# Patient Record
Sex: FEMALE | Race: WHITE | Employment: FULL TIME | URBAN - METROPOLITAN AREA
[De-identification: names, ages, dates, MRNs, and addresses within clinical notes are randomized per-mention and may not be internally consistent; named-entity substitution may affect disease eponyms.]

---

## 2022-04-26 ENCOUNTER — TELEPHONE (OUTPATIENT)
Dept: OBGYN CLINIC | Age: 32
End: 2022-04-26

## 2022-04-26 NOTE — TELEPHONE ENCOUNTER
Agree with labs and SAB prec. Looks like she could get US scheduled for 5/3 @ 0800 (blocked for fetal scan, but the 0730 is f/u SAB, not 20wk US, so should be OK to book). Then can put on my schedule for 0840).

## 2022-04-26 NOTE — TELEPHONE ENCOUNTER
Call received at 4:00pm       27year old patient calling to reports her [address] 3/14/2022 ( 6w1d) pregnant and got a positive upt on 4/10/2022    Patient is calling to say that she started with pink  spotting this past weekend and now the spotting is red and she put a pad on. Patient reports cramping at 3-4 on the pain scale of 1-10    Patient denies recent intercourse and reports BM this am after bleeding had started back      Patient was placed on the schedule for beta and blood  Type tomorrow at 8:30am and then on Friday at 8:30am  Placed on the schedule for lab only per Dr Russell Graves.     DONNA    Patient was provided pain and bleeding precautions and to increase po fluids, rest and tylenol

## 2022-04-27 ENCOUNTER — LAB ONLY (OUTPATIENT)
Dept: OBGYN CLINIC | Age: 32
End: 2022-04-27

## 2022-04-27 DIAGNOSIS — O46.90 VAGINAL BLEEDING DURING PREGNANCY: Primary | ICD-10-CM

## 2022-04-27 NOTE — TELEPHONE ENCOUNTER
Patient advised of MD recommendations and was rescheduled for 5/3/2022 at 8:00am to arrive at 7:45am and then MD at 8:40am ( per DM)    Patient verbalized understanding.

## 2022-04-28 LAB
ABO + RH BLD: NORMAL
BLOOD BANK CMNT PATIENT-IMP: NORMAL
BLOOD GROUP ANTIBODIES SERPL: NORMAL
HCG SERPL-ACNC: ABNORMAL MIU/ML (ref 0–6)
SPECIMEN EXP DATE BLD: NORMAL

## 2022-04-29 ENCOUNTER — LAB ONLY (OUTPATIENT)
Dept: OBGYN CLINIC | Age: 32
End: 2022-04-29

## 2022-04-29 DIAGNOSIS — O46.90 VAGINAL BLEEDING DURING PREGNANCY: Primary | ICD-10-CM

## 2022-04-30 LAB — HCG SERPL-ACNC: ABNORMAL MIU/ML (ref 0–6)

## 2022-05-02 NOTE — PROGRESS NOTES
Problem Visit-Complete    Chief Complaint   Missed Menses      HPI  Liddie Meckel is a 34 y.o. female who presents for the evaluation of pregnancy viability. Patient's last menstrual period was 03/14/2022. +nausea, smell sensitivities    Today's ultasound showed:  TV ULTRASOUND PERFORMED  A SINGLE VIABLE 7W2D IUP IS SEEN WITH NORMAL CARDIAC RHYTHM. GESTATIONAL AGE BASED ON TODAYS ULTRASOUND. A NORMAL YOLK SAC IS SEEN. RIGHT ADNEXA APPEARS WITHIN NORMAL LIMITS. LEFT OVARY APPEARS WITHIN NORMAL LIMITS. A CL CYST IS SEEN.   FREE FLUID SEEN IN THE CDS    Past Medical History:   Diagnosis Date    Anxiety     Cholesteatoma of right ear     Depression      Past Surgical History:   Procedure Laterality Date    HX BREAST REDUCTION  2012    HX OTHER SURGICAL      right ear surgery 5 times    HX WISDOM TEETH EXTRACTION       Social History     Occupational History    Not on file   Tobacco Use    Smoking status: Never Smoker    Smokeless tobacco: Never Used   Vaping Use    Vaping Use: Not on file   Substance and Sexual Activity    Alcohol use: Not Currently    Drug use: Never    Sexual activity: Yes     Partners: Male     Birth control/protection: None     Family History   Problem Relation Age of Onset    Heart Disease Mother     Diabetes Mother     Ovarian Cancer Other        Not on File  Prior to Admission medications    Not on File        Review of Systems: History obtained from the patient  Constitutional: negative for weight loss, fever, night sweats  HEENT: negative for hearing loss, earache, congestion, snoring, sorethroat  CV: negative for chest pain, palpitations, edema  Resp: negative for cough, shortness of breath, wheezing  Breast: negative for breast lumps, nipple discharge, galactorrhea  GI: negative for change in bowel habits, abdominal pain, black or bloody stools  : negative for frequency, dysuria, hematuria, vaginal discharge  MSK: negative for back pain, joint pain, muscle pain  Skin: negative for itching, rash, hives  Neuro: negative for dizziness, headache, confusion, weakness  Psych: negative for anxiety, depression, change in mood  Heme/lymph: negative for bleeding, bruising, pallor    Objective:  Visit Vitals  /76   Pulse 95   Ht 5' 7\" (1.702 m)   Wt 170 lb (77.1 kg)   LMP 2022   BMI 26.63 kg/m²       Physical Exam:     Constitutional  · Appearance: well-nourished, well developed, alert, in no acute distress    HENT  · Head and Face: appears normal    Skin  · General Inspection: no rash, no lesions identified    Neurologic/Psychiatric  · Mental Status:  · Orientation: grossly oriented to person, place and time  · Mood and Affect: mood normal, affect appropriate    Assessment:  First trimester spotting  Jeremías franks today  N/V  Lives by [facility] will probably want to continue care there. Plan:   Reviewed Jeremías FRANKS, cwmarisabel  eRX phenergan, zofran, diclegis  NOB scheduled here for . However, advised if she is planning on continuing care at [facility], should schedule NOB visit there and cancel appt here. Orders Placed This Encounter    doxylamine-pyridoxine, vit B6, (Diclegis) 10-10 mg TbEC DR tablet     Si tabs at bedtime, then 1 tab in AM if needed, then 1 tab in afternoon if needed. Max 4 tabs/d     Dispense:  120 Tablet     Refill:  2    promethazine (PHENERGAN) 25 mg tablet     Sig: Take 1 Tablet by mouth every six (6) hours as needed for Nausea. Dispense:  30 Tablet     Refill:  1    ondansetron (ZOFRAN ODT) 4 mg disintegrating tablet     Sig: Take 1 Tablet by mouth every eight (8) hours as needed for Nausea or Vomiting.      Dispense:  30 Tablet     Refill:  2

## 2022-05-03 ENCOUNTER — OFFICE VISIT (OUTPATIENT)
Dept: OBGYN CLINIC | Age: 32
End: 2022-05-03

## 2022-05-03 VITALS
WEIGHT: 170 LBS | DIASTOLIC BLOOD PRESSURE: 76 MMHG | HEIGHT: 67 IN | SYSTOLIC BLOOD PRESSURE: 124 MMHG | BODY MASS INDEX: 26.68 KG/M2 | HEART RATE: 95 BPM

## 2022-05-03 DIAGNOSIS — O46.90 VAGINAL BLEEDING DURING PREGNANCY: Primary | ICD-10-CM

## 2022-05-03 PROCEDURE — 99203 OFFICE O/P NEW LOW 30 MIN: CPT | Performed by: OBSTETRICS & GYNECOLOGY

## 2022-05-03 RX ORDER — DOXYLAMINE SUCCINATE AND PYRIDOXINE HYDROCHLORIDE, DELAYED RELEASE TABLETS 10 MG/10 MG 10; 10 MG/1; MG/1
TABLET, DELAYED RELEASE ORAL
Qty: 120 TABLET | Refills: 2 | Status: SHIPPED | OUTPATIENT
Start: 2022-05-03 | End: 2022-06-10 | Stop reason: ALTCHOICE

## 2022-05-03 RX ORDER — ONDANSETRON 4 MG/1
4 TABLET, ORALLY DISINTEGRATING ORAL
Qty: 30 TABLET | Refills: 2 | Status: SHIPPED | OUTPATIENT
Start: 2022-05-03 | End: 2022-06-10 | Stop reason: SDUPTHER

## 2022-05-03 RX ORDER — PROMETHAZINE HYDROCHLORIDE 25 MG/1
25 TABLET ORAL
Qty: 30 TABLET | Refills: 1 | Status: SHIPPED | OUTPATIENT
Start: 2022-05-03 | End: 2022-06-10 | Stop reason: SDUPTHER

## 2022-05-03 NOTE — PATIENT INSTRUCTIONS
Weeks 6 to 10 of Your Pregnancy: Care Instructions  Overview     During the first 6 to 10 weeks of your pregnancy, your body goes through many changes. Your baby grows very quickly, even though you can't feel it yet. You may start to feel different, both in your body and your emotions. Because each pregnancy is unique, there's no right way to feel. You may feel the healthiest you've ever been, or you might feel tired or sick to your stomach (\"morning sickness\"). These early weeks are a time to make healthy choices and to eat the best foods for you and your baby. This is also a good time to think about birth defects testing. These are tests done during pregnancy to look for possible problems with the baby. First-trimester tests for birth defects can be done between 8 and 13 weeks of pregnancy, depending on the test. Talk with your doctor about what kinds of tests are available. Follow-up care is a key part of your treatment and safety. Be sure to make and go to all appointments, and call your doctor if you are having problems. It's also a good idea to know your test results and keep a list of the medicines you take. How can you care for yourself at home? Eat well  · Eat at least 3 meals and 2 healthy snacks every day. Eat fresh, whole foods, including:  ? 7 or more servings of bread, tortillas, cereal, rice, pasta, or oatmeal.  ? 3 or more servings of vegetables, especially leafy green vegetables. ? 2 or more servings of fruits. ? 3 or more servings of milk, yogurt, or cheese. ? 2 or more servings of meat, turkey, chicken, fish, eggs, or dried beans. · Drink plenty of fluids, especially water. Avoid sodas and other sweetened drinks. · Choose foods that have important vitamins for your baby, such as calcium, iron, and folate. ? Dairy products, tofu, canned fish with bones, almonds, broccoli, dark leafy greens, corn tortillas, and fortified orange juice are good sources of calcium. ?  Beef, poultry, liver, spinach, lentils, dried beans, fortified cereals, and dried fruits are rich in iron. ? Dark leafy greens, broccoli, asparagus, liver, fortified cereals, orange juice, peanuts, and almonds are good sources of folate. · Avoid foods that could harm your baby. ? Do not eat raw or undercooked meat, chicken, or fish (such as sushi or raw oysters). ? Do not eat raw eggs or foods that contain raw eggs, such as Caesar dressing. ? Do not eat soft cheeses and unpasteurized dairy foods, such as Brie, feta, or blue cheese. ? Do not eat fish that contains a lot of mercury, such as shark, swordfish, tilefish, or jessy mackerel. Limit some other types of fish, such as white (albacore) tuna, to 4 oz (0.1 kg) a week. ? Do not eat raw sprouts, especially alfalfa sprouts. ? Cut down on caffeine, such as coffee, tea, and cola. Protect yourself and your baby  · Do not touch yazmin litter or cat feces. They can cause an infection that could harm your baby. · Avoid things that can make your body too hot and may be harmful to your baby, such as a hot tub or sauna. Or talk with your doctor before doing anything that raises your body temperature. Your doctor can tell you if it's safe. [place] with morning sickness  · Sip small amounts of water, juices, or shakes. Try drinking between meals, not with meals. · Eat 5 or 6 small meals a day. Try dry toast or crackers when you first get up, and eat breakfast a little later. · Avoid spicy, greasy, and fatty foods. · When you feel sick, open your windows or go for a short walk to get fresh air. · Try nausea wristbands. These help some people. · Tell your doctor if you think your prenatal vitamins make you sick. Where can you learn more? Go to http://www.gray.com/  Enter G112 in the search box to learn more about \"Weeks 6 to 10 of Your Pregnancy: Care Instructions. \"  Current as of: June 16, 2021               Content Version: 13.2  © 8436-8514 Healthwise, [employer]. Care instructions adapted under license by [employer] (which disclaims liability or warranty for this information). If you have questions about a medical condition or this instruction, always ask your healthcare professional. [address] any warranty or liability for your use of this information. Learning About When to Call Your Doctor During Pregnancy (Up to 20 Weeks)  Overview     It's common to have concerns about what might be a problem during your pregnancy. Most pregnancies don't have any serious problems. But it's still important to know when to call your doctor if you have certain symptoms. These are general suggestions. Your doctor may give you some more information about when to call. When to call your doctor (up to 20 weeks)  Call 911 anytime you think you may need emergency care. For example, call if:  · You passed out (lost consciousness). Call your doctor now or seek immediate medical care if:  · You have a fever. · You have vaginal bleeding. · You are dizzy or lightheaded, or you feel like you may faint. · You have symptoms of a urinary tract infection. These may include:  ? Pain or burning when you urinate. ? A frequent need to urinate without being able to pass much urine. ? Pain in the flank, which is just below the rib cage and above the waist on either side of the back. ? Blood in your urine. · You have belly pain. · You think you are having contractions. · You have a sudden release of fluid from your vagina. Watch closely for changes in your health, and be sure to contact your doctor if:  · You have vaginal discharge that smells bad. · You have other concerns about your pregnancy. Follow-up care is a key part of your treatment and safety. Be sure to make and go to all appointments, and call your doctor if you are having problems.  It's also a good idea to know your test results and keep a list of the medicines you take.  Where can you learn more? Go to http://www.[employer].com/  Enter Q629 in the search box to learn more about \"Learning About When to Call Your Doctor During Pregnancy (Up to 20 Weeks). \"  Current as of: June 16, 2021               Content Version: 13.2  © 5098-5227 Healthwise, [employer]. Care instructions adapted under license by [employer] (which disclaims liability or warranty for this information). If you have questions about a medical condition or this instruction, always ask your healthcare professional. [address] any warranty or liability for your use of this information.

## 2022-06-10 ENCOUNTER — INITIAL PRENATAL (OUTPATIENT)
Dept: OBGYN CLINIC | Age: 32
End: 2022-06-10

## 2022-06-10 VITALS — BODY MASS INDEX: 28.13 KG/M2 | WEIGHT: 179.6 LBS | SYSTOLIC BLOOD PRESSURE: 118 MMHG | DIASTOLIC BLOOD PRESSURE: 90 MMHG

## 2022-06-10 DIAGNOSIS — O16.1 ELEVATED BLOOD PRESSURE AFFECTING PREGNANCY IN FIRST TRIMESTER, ANTEPARTUM: ICD-10-CM

## 2022-06-10 DIAGNOSIS — Z36.9 UNSPECIFIED ANTENATAL SCREENING: Primary | ICD-10-CM

## 2022-06-10 DIAGNOSIS — Z3A.12 12 WEEKS GESTATION OF PREGNANCY: ICD-10-CM

## 2022-06-10 PROCEDURE — 0500F INITIAL PRENATAL CARE VISIT: CPT | Performed by: ADVANCED PRACTICE MIDWIFE

## 2022-06-10 RX ORDER — PROMETHAZINE HYDROCHLORIDE 25 MG/1
25 TABLET ORAL
Qty: 30 TABLET | Refills: 1 | Status: SHIPPED | OUTPATIENT
Start: 2022-06-10 | End: 2022-06-29

## 2022-06-10 RX ORDER — ONDANSETRON 4 MG/1
4 TABLET, ORALLY DISINTEGRATING ORAL
Qty: 30 TABLET | Refills: 2 | Status: SHIPPED | OUTPATIENT
Start: 2022-06-10 | End: 2022-07-12 | Stop reason: SDUPTHER

## 2022-06-10 NOTE — PATIENT INSTRUCTIONS
Weeks 10 to 14 of Your Pregnancy: Care Instructions  Overview     By weeks 10 to 14 of your pregnancy, the placenta has formed inside your uterus. The placenta's main job is to give your baby oxygen and nutrients through the umbilical cord. It's possible to hear your baby's heartbeat with a special ultrasound device. Your baby's organs are developing. The arms and legs can bend. This is a good time to think about testing for birth defects. There are two types of tests: screening and diagnostic. Screening tests show the chance that a baby has a certain birth defect. They can't tell you for sure that your baby has a problem. Diagnostic tests show if a baby has a certain birth defect. It's your choice whether to have these tests. You and your partner can talk to your doctor or midwife about tests for birth defects. Follow-up care is a key part of your treatment and safety. Be sure to make and go to all appointments, and call your doctor if you are having problems. It's also a good idea to know your test results and keep a list of the medicines you take. How can you care for yourself at home? Decide about tests  · You can have screening tests and diagnostic tests to check for birth defects. The decision to have a test for birth defects is personal. Think about your age, your chance of passing on a family disease, your need to know about any problems, and what you might do after you have the test results. ? Quadruple (quad) blood test. This screening test can be done between 15 and 22 weeks of pregnancy. It checks the amount of four substances in your blood. The doctor looks at these test results, along with your age and other factors, to find out the chance that your baby may have certain problems. ? Amniocentesis. This diagnostic test is used to look for chromosomal problems in the baby's cells.  It can be done between 15 and 20 weeks of pregnancy, usually around week 16.  ? Nuchal translucency test. This test uses ultrasound to measure the thickness of the area at the back of the baby's neck. An increase in the thickness can be an early sign of Down syndrome. ? Chorionic villus sampling (CVS). This is a test that looks for certain genetic problems with your baby. The same genes that are in your baby are in the placenta. A small piece of the placenta is taken out and tested. This test is done when you are 10 to 13 weeks pregnant. Ease discomfort  · Slow down and take naps when you feel tired. · If your emotions swing, talk to someone. · If your gums bleed, try a softer toothbrush. If your gums are puffy and bleed a lot, see your dentist.  · If you feel dizzy:  ? Get up slowly after sitting or lying down. ? Drink plenty of fluids. ? Eat small snacks to keep your blood sugar stable. ? Put your head between your legs as though you were tying your shoelaces. ? Lie down with your legs higher than your head. Use pillows to prop up your feet. · If you have a headache:  ? Lie down. ? Ask your partner or a good friend for a neck massage. ? Try cool cloths over your forehead or across the back of your neck. ? Use acetaminophen (Tylenol) for pain relief. Do not use nonsteroidal anti-inflammatory drugs (NSAIDs), such as ibuprofen (Advil, Motrin) or naproxen (Aleve), unless your doctor says it is okay. · If you have a nosebleed, pinch your nose gently, and hold it for a short while. To prevent nosebleeds, try massaging a small dab of petroleum jelly, such as Vaseline, in your nostrils. · If your nose is stuffed up, try saline (saltwater) nose sprays. Do not use decongestant sprays. Care for your breasts  · Wear a bra that gives you good support. · Know that changes in your breasts are normal.  ? Your breasts may get larger and more tender. Tenderness usually gets better by 12 weeks. ? Your nipples may get darker and larger, and small bumps around your nipples may show more. ?  The veins in your chest and breasts may show more. Where can you learn more? Go to http://www.gray.com/  Enter P874 in the search box to learn more about \"Weeks 10 to 14 of Your Pregnancy: Care Instructions. \"  Current as of: June 16, 2021               Content Version: 13.2  © 0283-4778 Healthwise, [employer]. Care instructions adapted under license by [employer] (which disclaims liability or warranty for this information). If you have questions about a medical condition or this instruction, always ask your healthcare professional. [place] [address] any warranty or liability for your use of this information.

## 2022-06-10 NOTE — PROGRESS NOTES
Current pregnancy history:    Jimi Huber is a ,  34 y.o. female [address] [facility] Patient's last menstrual period was 2022. .  She presents for the evaluation of amenorrhea and a positive pregnancy test.  He sought care @ [facility] in early May for bleeding in the first trimester and severe NVP  She has had no further bleeding since her visit in May. LMP history:  The date of her LMP is certain. Her last menstrual period was normal and lasted for 4 to 5 days. A urine pregnancy test was positive 4 weeks ago. She was not on the pill at conception. Based on her LMP, her EDC is 22 and her EGA is 12 weeks,4 days. Her menstrual cycles are regular and occur approximately every 28 days  and range from 3 to 5 days. The last menses lasted  the usual number of days. Ultrasound data:  She had an  ultrasound done by the ultrasound tech @ [facility] on 5/3  Which showed:   A SINGLE VIABLE 7W2D IUP IS SEEN WITH NORMAL CARDIAC RHYTHM. GESTATIONAL AGE BASED ON TODAYS ULTRASOUND. A NORMAL YOLK SAC IS SEEN. RIGHT ADNEXA APPEARS WITHIN NORMAL LIMITS. LEFT OVARY APPEARS WITHIN NORMAL LIMITS. A CL CYST IS SEEN. FREE FLUID SEEN IN THE CDS     Pregnancy symptoms:    Since her LMP she has experienced  urinary frequency, breast tenderness, and nausea. She has been vomiting over the last few weeks. Associated signs and symptoms which she denies: dysuria, discharge, vaginal bleeding. She states she has gained weight:  Approximately 5 pounds over the last few weeks. Relevant past pregnancy history:   She has the following pregnancy history: Her last pregnancy was uncomplicated. She has no history of  delivery. Relevant past medical history:(relevant to this pregnancy): noncontributory. Pap/Occupational history:  Last pap smear: last year Results: Normal        Substance history: negative for alcohol, tobacco and street drugs. Positive for nothing. Exposure history:  There is/are no indoor cat/s in the home. The patient was instructed to not change the cat litter. She admits close contact with children on a regular basis. She has had chicken pox or the vaccine in the past.   Patient denies issues with domestic violence. Genetic Screening/Teratology Counseling: (Includes patient, baby's father, or anyone in either family with:)  3.  Patient's age >/= 28 at [facility] 39?-- no  .   2. Thalassemia ([facility], [facility], [facility], [facility], or  background): MCV<80?--no.     3.  Neural tube defect (meningomyelocele, spina bifida, anencephaly)?--no.   4.  Congenital heart defect?--no.  5.  Down syndrome?--no.   6.  Harman-Sachs ([place] [place])?--no.   7.  Canavan's Disease?--no.   8.  Familial Dysautonomia?--no.   9.  Sickle cell disease or trait ()? --no   The patient has not been tested for sickle trait  10. Hemophilia or other blood disorders?--no. 11.  Muscular dystrophy?--no. 12.  Cystic fibrosis?--no. 13.  Mangham's Chorea?--no. 14.  Mental retardation/autism (if yes was person tested for Fragile X)?--no. 15.  Other inherited genetic or chromosomal disorder?--no. 12.  Maternal metabolic disorder (DM, PKU, etc)?--no. 17.  Patient or FOB with a child with a birth defect not listed above?--no.  17a. Patient or FOB with a birth defect themselves?--no. 18.  Recurrent pregnancy loss, or stillbirth?--no. 19.  Any medications since LMP other than prenatal vitamins (include vitamins, supplements, OTC meds, drugs, alcohol)?--no. 20.  Any other genetic/environmental exposure to discuss?--no. Infection History:  1. Lives with someone with TB or TB exposed?--no.   2.  Patient or partner has history of genital herpes?--no.  3.  Rash or viral illness since LMP?--no.    4.  History of STD (GC, CT, HPV, syphilis, HIV)? --no   5. Other: OTHER?       Past Medical History:   Diagnosis Date    Anxiety     Cholesteatoma of right ear     Depression      Past Surgical History: Procedure Laterality Date    HX BREAST REDUCTION      HX OTHER SURGICAL      right ear surgery 5 times    HX WISDOM TEETH EXTRACTION       Social History     Occupational History    Not on file   Tobacco Use    Smoking status: Never Smoker    Smokeless tobacco: Never Used   Vaping Use    Vaping Use: Not on file   Substance and Sexual Activity    Alcohol use: Not Currently    Drug use: Never    Sexual activity: Yes     Partners: Male     Birth control/protection: None     Family History   Problem Relation Age of Onset    Heart Disease Mother     Diabetes Mother     Ovarian Cancer Other      OB History    Para Term  AB Living   1 0 0 0 0 0   SAB IAB Ectopic Molar Multiple Live Births   0 0 0 0 0 0      # Outcome Date GA Lbr Omero/2nd Weight Sex Delivery Anes PTL Lv   1 Current              Not on File  Prior to Admission medications    Medication Sig Start Date End Date Taking? Authorizing Provider   PNV Comb #2-Iron-Omega 3-FA [id number] mg cmpk Take  by mouth. Yes Provider, Historical   promethazine (PHENERGAN) 25 mg tablet Take 1 Tablet by mouth every six (6) hours as needed for Nausea. 5/3/22  Yes Glover Kawasaki, MD   ondansetron (ZOFRAN ODT) 4 mg disintegrating tablet Take 1 Tablet by mouth every eight (8) hours as needed for Nausea or Vomiting. 5/3/22  Yes Glover Kawasaki, MD   doxylamine-pyridoxine, vit B6, (Diclegis) 10-10 mg TbEC DR tablet 2 tabs at bedtime, then 1 tab in AM if needed, then 1 tab in afternoon if needed.  Max 4 tabs/d  Patient not taking: Reported on 6/10/2022 5/3/22   Glover Kawasaki, MD        Review of Systems: History obtained from the patient  Constitutional: negative for weight loss, fever, night sweats  HEENT: negative for hearing loss, earache, congestion, snoring, sore throat  CV: negative for chest pain, palpitations, edema  Resp: negative for cough, shortness of breath, wheezing  Breast: negative for breast lumps, nipple discharge, galactorrhea  GI: negative for change in bowel habits, abdominal pain, black or bloody stools  : negative for frequency, dysuria, hematuria, vaginal discharge  MSK: negative for back pain, joint pain, muscle pain  Skin: negative for itching, rash, hives  Neuro: negative for dizziness, headache, confusion, weakness  Psych: negative for anxiety, depression, change in mood  Heme/lymph: negative for bleeding, bruising, pallor    Objective:  Visit Vitals  BP (!) 118/90   Wt 179 lb 9.6 oz (81.5 kg)   LMP 03/14/2022   BMI 28.13 kg/m²       Physical Exam:     Constitutional  · Appearance: well-nourished, well developed, alert, in no acute distress    HENT  · Head  · Face: appears normal  · Eyes: appear normal  · Ears: normal  · Mouth: normal  · Lips: no lesions    Neck  · Inspection/Palpation: normal appearance,     Chest  · Respiratory Effort: breathing unlabored      Genitourinary  · External Genitalia: normal appearance for age, no discharge present, no tenderness present, no inflammatory lesions present, no masses present, no     Skin  · General Inspection: no rash, no lesions identified    Neurologic/Psychiatric  · Mental Status:  · Orientation: grossly oriented to person, place and time  · Mood and Affect: mood normal, affect appropriate    Assessment:   Intrauterine pregnancy with the following problems identified: NVP. Plan:   Declines Genetic screening  NOB labs today add TSH due to NVP  Rx sent for Zofran and Phenergan  Rev Routines, CNM care, call  F/U 2 wks for BP check    Offered CF testing, CVS, Nuchal Translucency, MSAFP, amnio, and discussed NIPT  Course of pregnancy discussed including visit schedule, routine U/S, glucola testing, etc.  Avoid alcoholic beverages and illicit/recreational drugs use  Take prenatal vitamins or folic acid daily. Hospital and practice style discussed with coverage system.   Discussed nutrition, toxoplasmosis precautions, sexual activity, exercise, need for influenza vaccine, environmental and work hazards, travel advice, screen for domestic violence, need for seat belts. Discussed seafood, unpasteurized dairy products, deli meat, artificial sweeteners, and caffeine. Information on prenatal classes/breastfeeding given. Information on circumcision given  Patient encouraged not to smoke. Discussed current prescription drug use. Given medication list.  Discussed the use of over the counter medications and chemicals. Route of delivery discussed, including risks, benefits, and alternatives of  versus repeat LTCS. Pt understands risk of hemorrhage during pregnancy and post delivery and would accept blood products if necessary in life-threatening emergencies    Handouts given to pt. Monique Prescott.  Alex Farrell, DAHIANA/JOSÉ MIGUEL

## 2022-06-11 LAB
ABO + RH BLD: NORMAL
ALBUMIN SERPL-MCNC: 3.8 G/DL (ref 3.5–5)
ALBUMIN/GLOB SERPL: 1.2 {RATIO} (ref 1.1–2.2)
ALP SERPL-CCNC: 58 U/L (ref 45–117)
ALT SERPL-CCNC: 32 U/L (ref 12–78)
ANION GAP SERPL CALC-SCNC: 8 MMOL/L (ref 5–15)
AST SERPL-CCNC: 17 U/L (ref 15–37)
BACTERIA SPEC CULT: NORMAL
BASOPHILS # BLD: 0 K/UL (ref 0–0.1)
BASOPHILS NFR BLD: 0 % (ref 0–1)
BILIRUB SERPL-MCNC: 0.3 MG/DL (ref 0.2–1)
BLOOD BANK CMNT PATIENT-IMP: NORMAL
BLOOD GROUP ANTIBODIES SERPL: NORMAL
BUN SERPL-MCNC: 6 MG/DL (ref 6–20)
BUN/CREAT SERPL: 11 (ref 12–20)
CALCIUM SERPL-MCNC: 9.6 MG/DL (ref 8.5–10.1)
CHLORIDE SERPL-SCNC: 103 MMOL/L (ref 97–108)
CO2 SERPL-SCNC: 24 MMOL/L (ref 21–32)
CREAT SERPL-MCNC: 0.55 MG/DL (ref 0.55–1.02)
DIFFERENTIAL METHOD BLD: ABNORMAL
EOSINOPHIL # BLD: 0.1 K/UL (ref 0–0.4)
EOSINOPHIL NFR BLD: 1 % (ref 0–7)
ERYTHROCYTE [DISTWIDTH] IN BLOOD BY AUTOMATED COUNT: 13.7 % (ref 11.5–14.5)
GLOBULIN SER CALC-MCNC: 3.1 G/DL (ref 2–4)
GLUCOSE SERPL-MCNC: 101 MG/DL (ref 65–100)
HBV SURFACE AG SER QL: 0.16 INDEX
HBV SURFACE AG SER QL: NEGATIVE
HCT VFR BLD AUTO: 39.9 % (ref 35–47)
HGB BLD-MCNC: 12.8 G/DL (ref 11.5–16)
HIV 1+2 AB+HIV1 P24 AG SERPL QL IA: NONREACTIVE
HIV12 RESULT COMMENT, HHIVC: NORMAL
IMM GRANULOCYTES # BLD AUTO: 0 K/UL (ref 0–0.04)
IMM GRANULOCYTES NFR BLD AUTO: 0 % (ref 0–0.5)
LYMPHOCYTES # BLD: 1.6 K/UL (ref 0.8–3.5)
LYMPHOCYTES NFR BLD: 15 % (ref 12–49)
MCH RBC QN AUTO: 29.6 PG (ref 26–34)
MCHC RBC AUTO-ENTMCNC: 32.1 G/DL (ref 30–36.5)
MCV RBC AUTO: 92.4 FL (ref 80–99)
MONOCYTES # BLD: 0.4 K/UL (ref 0–1)
MONOCYTES NFR BLD: 4 % (ref 5–13)
NEUTS SEG # BLD: 8.7 K/UL (ref 1.8–8)
NEUTS SEG NFR BLD: 80 % (ref 32–75)
NRBC # BLD: 0 K/UL (ref 0–0.01)
NRBC BLD-RTO: 0 PER 100 WBC
PLATELET # BLD AUTO: 270 K/UL (ref 150–400)
PMV BLD AUTO: 10.4 FL (ref 8.9–12.9)
POTASSIUM SERPL-SCNC: 3.6 MMOL/L (ref 3.5–5.1)
PROT SERPL-MCNC: 6.9 G/DL (ref 6.4–8.2)
RBC # BLD AUTO: 4.32 M/UL (ref 3.8–5.2)
RUBV IGG SER-IMP: REACTIVE
RUBV IGG SERPL IA-ACNC: 17.9 IU/ML
SERVICE CMNT-IMP: NORMAL
SODIUM SERPL-SCNC: 135 MMOL/L (ref 136–145)
SPECIMEN EXP DATE BLD: NORMAL
TSH SERPL DL<=0.05 MIU/L-ACNC: 0.21 UIU/ML (ref 0.36–3.74)
URATE SERPL-MCNC: 2.4 MG/DL (ref 2.6–6)
WBC # BLD AUTO: 10.9 K/UL (ref 3.6–11)

## 2022-06-12 LAB
C TRACH RRNA SPEC QL NAA+PROBE: NEGATIVE
N GONORRHOEA RRNA SPEC QL NAA+PROBE: NEGATIVE
T VAGINALIS RRNA SPEC QL NAA+PROBE: NEGATIVE

## 2022-06-13 LAB
T PALLIDUM AB SER QL IA: NON REACTIVE
VZV IGG SER IA-ACNC: 222 INDEX

## 2022-06-14 DIAGNOSIS — R79.89 LOW TSH LEVEL: Primary | ICD-10-CM

## 2022-06-14 DIAGNOSIS — Z3A.13 13 WEEKS GESTATION OF PREGNANCY: ICD-10-CM

## 2022-06-14 LAB
HGB A MFR BLD: 97.5 % (ref 96.4–98.8)
HGB A2 MFR BLD COLUMN CHROM: 2.5 % (ref 1.8–3.2)
HGB F MFR BLD: 0 % (ref 0–2)
HGB FRACT BLD-IMP: NORMAL
HGB S MFR BLD: 0 %

## 2022-06-29 ENCOUNTER — ROUTINE PRENATAL (OUTPATIENT)
Dept: OBGYN CLINIC | Age: 32
End: 2022-06-29
Payer: COMMERCIAL

## 2022-06-29 VITALS
BODY MASS INDEX: 28.72 KG/M2 | HEIGHT: 67 IN | WEIGHT: 183 LBS | DIASTOLIC BLOOD PRESSURE: 80 MMHG | SYSTOLIC BLOOD PRESSURE: 128 MMHG

## 2022-06-29 DIAGNOSIS — Z3A.12 12 WEEKS GESTATION OF PREGNANCY: ICD-10-CM

## 2022-06-29 DIAGNOSIS — Z34.02 ENCOUNTER FOR SUPERVISION OF NORMAL FIRST PREGNANCY IN SECOND TRIMESTER: Primary | ICD-10-CM

## 2022-06-29 PROCEDURE — 0502F SUBSEQUENT PRENATAL CARE: CPT | Performed by: ADVANCED PRACTICE MIDWIFE

## 2022-06-29 RX ORDER — METOCLOPRAMIDE 10 MG/1
10 TABLET ORAL
Qty: 40 TABLET | Refills: 3 | Status: SHIPPED | OUTPATIENT
Start: 2022-06-29 | End: 2022-07-09

## 2022-06-29 NOTE — PROGRESS NOTES
Feeling very fatigued and dehydrated. Vomiting 5-6 times daily still- feels terrible, tearful at visit - she is over it   Headaches daily - thinks its bc she is sick- recommend magnesium  Trying to take gummy vitamins but even that is difficult somedays  Lemonade and Gatorade stay down best - can eat - gaining weight- just does always stay down and she feels miserable   cramping occasionally - reviewed normal versus abnormal  BP check today 128/80. Given endo referral information again.     [facility] referral placed for zofran pump with IV hydration PRN  reglan rx -   zofran making her constipated and phenergan not helping her sleep   KRIS 4 weeks with FAS

## 2022-07-07 DIAGNOSIS — E07.9 THYROID DYSFUNCTION IN PREGNANCY IN SECOND TRIMESTER: Primary | ICD-10-CM

## 2022-07-07 DIAGNOSIS — E07.9 THYROID DYSFUNCTION: ICD-10-CM

## 2022-07-07 DIAGNOSIS — O99.282 THYROID DYSFUNCTION IN PREGNANCY IN SECOND TRIMESTER: Primary | ICD-10-CM

## 2022-07-12 RX ORDER — ONDANSETRON 4 MG/1
4 TABLET, ORALLY DISINTEGRATING ORAL
Qty: 30 TABLET | Refills: 2 | Status: SHIPPED | OUTPATIENT
Start: 2022-07-12 | End: 2022-10-13

## 2022-07-13 RX ORDER — ONDANSETRON 8 MG/1
8 TABLET, ORALLY DISINTEGRATING ORAL
Qty: 30 TABLET | Refills: 1 | Status: SHIPPED | OUTPATIENT
Start: 2022-07-13

## 2022-07-15 ENCOUNTER — TELEPHONE (OUTPATIENT)
Dept: OBGYN CLINIC | Age: 32
End: 2022-07-15

## 2022-07-15 NOTE — TELEPHONE ENCOUNTER
34year old  17w4 pregnant     [facility] diabetes calling to say that the patient no showed her recent appointment      DONNA

## 2022-07-25 ENCOUNTER — ROUTINE PRENATAL (OUTPATIENT)
Dept: OBGYN CLINIC | Age: 32
End: 2022-07-25

## 2022-07-25 VITALS
WEIGHT: 191 LBS | HEIGHT: 67 IN | BODY MASS INDEX: 29.98 KG/M2 | SYSTOLIC BLOOD PRESSURE: 122 MMHG | DIASTOLIC BLOOD PRESSURE: 74 MMHG

## 2022-07-25 DIAGNOSIS — Z3A.20 20 WEEKS GESTATION OF PREGNANCY: ICD-10-CM

## 2022-07-25 DIAGNOSIS — O99.282 HYPERTHYROIDISM AFFECTING PREGNANCY IN SECOND TRIMESTER: Primary | ICD-10-CM

## 2022-07-25 DIAGNOSIS — E05.90 HYPERTHYROIDISM AFFECTING PREGNANCY IN SECOND TRIMESTER: Primary | ICD-10-CM

## 2022-07-25 LAB — TSH SERPL DL<=0.05 MIU/L-ACNC: 0.85 UIU/ML (ref 0.36–3.74)

## 2022-07-25 PROCEDURE — 0502F SUBSEQUENT PRENATAL CARE: CPT | Performed by: ADVANCED PRACTICE MIDWIFE

## 2022-07-25 RX ORDER — DEXTROAMPHETAMINE SACCHARATE, AMPHETAMINE ASPARTATE, DEXTROAMPHETAMINE SULFATE AND AMPHETAMINE SULFATE 5; 5; 5; 5 MG/1; MG/1; MG/1; MG/1
TABLET ORAL
COMMUNITY
Start: 2022-07-15 | End: 2022-08-26

## 2022-07-25 RX ORDER — ALPRAZOLAM 1 MG/1
TABLET ORAL
COMMUNITY
Start: 2022-07-15 | End: 2022-08-26

## 2022-07-25 NOTE — PROGRESS NOTES
Still having nausea/vomiting but better on the nausea meds. Also having headaches that resolve with cold compress and sleep. FETAL SURVEY  A SINGLE VIABLE IUP AT 19W0D IS SEEN. FETAL CARDIAC MOTION OBSERVED. FETAL ANATOMY WAS WELL VISUALIZED AND APPEARS WNL. NO ABNORMALITIES WERE SEEN ON TODAYS EXAM.  APPROPRIATE GROWTH MEASURED. SIZE = DATES. ISMAEL, PLACENTA AND CERVIX APPEAR WITHIN NORMAL LIMITS.   GENDER: FEMALE

## 2022-07-25 NOTE — PROGRESS NOTES
Rev sono results  Still having NVP, but less and Zofran seems to be helping better  Repeat TSH today, has been unable to get in with Endo until Oct  Hx of Migraines, having visual disturbance with HA, about Q wk, resolves mainly with dark room and cold compresses  Feeling movement  Rev med list, not taking Xanax and Adderall (recently prescribed by MD in [facility] she had previously seen) Aware not recommended in pregnancy

## 2022-07-25 NOTE — PROGRESS NOTES
OB Visit:    A SINGLE VIABLE IUP AT 19W0D IS SEEN. FETAL CARDIAC MOTION OBSERVED. FETAL ANATOMY WAS WELL VISUALIZED AND APPEARS WNL. NO ABNORMALITIES WERE SEEN ON TODAYS EXAM.  APPROPRIATE GROWTH MEASURED. SIZE = DATES. ISMAEL, PLACENTA AND CERVIX APPEAR WITHIN NORMAL LIMITS.   GENDER: FEMALE

## 2022-08-26 ENCOUNTER — ROUTINE PRENATAL (OUTPATIENT)
Dept: OBGYN CLINIC | Age: 32
End: 2022-08-26
Payer: COMMERCIAL

## 2022-08-26 VITALS — BODY MASS INDEX: 31.54 KG/M2 | DIASTOLIC BLOOD PRESSURE: 78 MMHG | SYSTOLIC BLOOD PRESSURE: 118 MMHG | WEIGHT: 201.4 LBS

## 2022-08-26 DIAGNOSIS — Z3A.23 23 WEEKS GESTATION OF PREGNANCY: ICD-10-CM

## 2022-08-26 PROCEDURE — 0502F SUBSEQUENT PRENATAL CARE: CPT | Performed by: ADVANCED PRACTICE MIDWIFE

## 2022-08-26 NOTE — PROGRESS NOTES
OB Visit:    + fetal movement, nausea is improving-using Zofran 1 time daily, reported numbness of LUE  Discussed use of bicycle gloves hand splints if worsens  Increase water, and protein, decrease salt intake  GFM  Glucola and Third tri labs next visit, add TSH, is still normal repeat 6 wks PP

## 2022-09-22 ENCOUNTER — ROUTINE PRENATAL (OUTPATIENT)
Dept: OBGYN CLINIC | Age: 32
End: 2022-09-22
Payer: COMMERCIAL

## 2022-09-22 VITALS
WEIGHT: 208 LBS | DIASTOLIC BLOOD PRESSURE: 88 MMHG | SYSTOLIC BLOOD PRESSURE: 140 MMHG | HEIGHT: 67 IN | BODY MASS INDEX: 32.65 KG/M2

## 2022-09-22 DIAGNOSIS — Z3A.27 27 WEEKS GESTATION OF PREGNANCY: Primary | ICD-10-CM

## 2022-09-22 DIAGNOSIS — O16.2 ELEVATED BLOOD PRESSURE AFFECTING PREGNANCY IN SECOND TRIMESTER, ANTEPARTUM: ICD-10-CM

## 2022-09-22 DIAGNOSIS — E05.90 HYPERTHYROIDISM AFFECTING PREGNANCY IN SECOND TRIMESTER: ICD-10-CM

## 2022-09-22 DIAGNOSIS — O99.282 HYPERTHYROIDISM AFFECTING PREGNANCY IN SECOND TRIMESTER: ICD-10-CM

## 2022-09-22 LAB
ALBUMIN SERPL-MCNC: 3.1 G/DL (ref 3.5–5)
ALBUMIN/GLOB SERPL: 0.9 {RATIO} (ref 1.1–2.2)
ALP SERPL-CCNC: 108 U/L (ref 45–117)
ALT SERPL-CCNC: 27 U/L (ref 12–78)
ANION GAP SERPL CALC-SCNC: 6 MMOL/L (ref 5–15)
AST SERPL-CCNC: 16 U/L (ref 15–37)
BILIRUB SERPL-MCNC: 0.2 MG/DL (ref 0.2–1)
BUN SERPL-MCNC: 7 MG/DL (ref 6–20)
BUN/CREAT SERPL: 11 (ref 12–20)
CALCIUM SERPL-MCNC: 9.3 MG/DL (ref 8.5–10.1)
CHLORIDE SERPL-SCNC: 105 MMOL/L (ref 97–108)
CO2 SERPL-SCNC: 23 MMOL/L (ref 21–32)
CREAT SERPL-MCNC: 0.63 MG/DL (ref 0.55–1.02)
CREAT UR-MCNC: 152 MG/DL
GLOBULIN SER CALC-MCNC: 3.4 G/DL (ref 2–4)
GLUCOSE SERPL-MCNC: 169 MG/DL (ref 65–100)
LDH SERPL L TO P-CCNC: 147 U/L (ref 81–246)
POTASSIUM SERPL-SCNC: 3.8 MMOL/L (ref 3.5–5.1)
PROT SERPL-MCNC: 6.5 G/DL (ref 6.4–8.2)
PROT UR-MCNC: 28 MG/DL (ref 0–11.9)
PROT/CREAT UR-RTO: 0.2
SODIUM SERPL-SCNC: 134 MMOL/L (ref 136–145)
URATE SERPL-MCNC: 2.9 MG/DL (ref 2.6–6)

## 2022-09-22 PROCEDURE — 0502F SUBSEQUENT PRENATAL CARE: CPT | Performed by: ADVANCED PRACTICE MIDWIFE

## 2022-09-22 NOTE — PROGRESS NOTES
Hands and feet swelling, feeling tight and \"full\", getting HAs a lot, seeing \"spots\". Has noticed a decrease in movement, but still feeling it throughout the day.   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

## 2022-09-23 ENCOUNTER — HOSPITAL ENCOUNTER (EMERGENCY)
Age: 32
Discharge: HOME | End: 2022-09-23
Payer: COMMERCIAL

## 2022-09-23 VITALS
SYSTOLIC BLOOD PRESSURE: 125 MMHG | RESPIRATION RATE: 20 BRPM | HEART RATE: 98 BPM | DIASTOLIC BLOOD PRESSURE: 85 MMHG | TEMPERATURE: 98.1 F

## 2022-09-23 LAB
ALBUMIN SERPL-MCNC: 2.9 G/DL (ref 3.5–5)
ALBUMIN/GLOB SERPL: 0.9 {RATIO} (ref 1.1–2.2)
ALP SERPL-CCNC: 105 U/L (ref 45–117)
ALT SERPL-CCNC: 28 U/L (ref 12–78)
ANION GAP SERPL CALC-SCNC: 8 MMOL/L (ref 5–15)
APPEARANCE UR: CLEAR
AST SERPL-CCNC: 18 U/L (ref 15–37)
BACTERIA URNS QL MICRO: ABNORMAL /HPF
BASOPHILS # BLD: 0 K/UL (ref 0–0.1)
BASOPHILS NFR BLD: 0 % (ref 0–1)
BILIRUB SERPL-MCNC: 0.2 MG/DL (ref 0.2–1)
BILIRUB UR QL: NEGATIVE
BLOOD BANK CMNT PATIENT-IMP: NORMAL
BLOOD GROUP ANTIBODIES SERPL: NORMAL
BUN SERPL-MCNC: 7 MG/DL (ref 6–20)
BUN/CREAT SERPL: 12 (ref 12–20)
CALCIUM SERPL-MCNC: 9 MG/DL (ref 8.5–10.1)
CHLORIDE SERPL-SCNC: 106 MMOL/L (ref 97–108)
CO2 SERPL-SCNC: 23 MMOL/L (ref 21–32)
COLOR UR: ABNORMAL
CREAT SERPL-MCNC: 0.6 MG/DL (ref 0.55–1.02)
CREAT UR-MCNC: 100 MG/DL
DIFFERENTIAL METHOD BLD: ABNORMAL
EOSINOPHIL # BLD: 0.1 K/UL (ref 0–0.4)
EOSINOPHIL NFR BLD: 1 % (ref 0–7)
EPITH CASTS URNS QL MICRO: ABNORMAL /LPF
ERYTHROCYTE [DISTWIDTH] IN BLOOD BY AUTOMATED COUNT: 13.5 % (ref 11.5–14.5)
ERYTHROCYTE [DISTWIDTH] IN BLOOD BY AUTOMATED COUNT: 13.8 % (ref 11.5–14.5)
GLOBULIN SER CALC-MCNC: 3.3 G/DL (ref 2–4)
GLUCOSE 1H P 100 G GLC PO SERPL-MCNC: 167 MG/DL (ref 65–140)
GLUCOSE SERPL-MCNC: 91 MG/DL (ref 65–100)
GLUCOSE UR STRIP.AUTO-MCNC: NEGATIVE MG/DL
HCT VFR BLD AUTO: 30.6 % (ref 35–47)
HCT VFR BLD AUTO: 33.4 % (ref 35–47)
HCV AB SERPL QL IA: NONREACTIVE
HGB BLD-MCNC: 10.3 G/DL (ref 11.5–16)
HGB BLD-MCNC: 10.6 G/DL (ref 11.5–16)
HGB UR QL STRIP: NEGATIVE
HIV 1+2 AB+HIV1 P24 AG SERPL QL IA: NONREACTIVE
HIV12 RESULT COMMENT, HHIVC: NORMAL
IMM GRANULOCYTES # BLD AUTO: 0.2 K/UL (ref 0–0.04)
IMM GRANULOCYTES NFR BLD AUTO: 2 % (ref 0–0.5)
KETONES UR QL STRIP.AUTO: 40 MG/DL
LDH SERPL L TO P-CCNC: 142 U/L (ref 81–246)
LEUKOCYTE ESTERASE UR QL STRIP.AUTO: NEGATIVE
LYMPHOCYTES # BLD: 1.7 K/UL (ref 0.8–3.5)
LYMPHOCYTES NFR BLD: 15 % (ref 12–49)
MCH RBC QN AUTO: 30.3 PG (ref 26–34)
MCH RBC QN AUTO: 30.9 PG (ref 26–34)
MCHC RBC AUTO-ENTMCNC: 31.7 G/DL (ref 30–36.5)
MCHC RBC AUTO-ENTMCNC: 33.7 G/DL (ref 30–36.5)
MCV RBC AUTO: 91.9 FL (ref 80–99)
MCV RBC AUTO: 95.4 FL (ref 80–99)
MONOCYTES # BLD: 0.7 K/UL (ref 0–1)
MONOCYTES NFR BLD: 6 % (ref 5–13)
NEUTS SEG # BLD: 8.7 K/UL (ref 1.8–8)
NEUTS SEG NFR BLD: 76 % (ref 32–75)
NITRITE UR QL STRIP.AUTO: NEGATIVE
NRBC # BLD: 0 K/UL (ref 0–0.01)
NRBC # BLD: 0 K/UL (ref 0–0.01)
NRBC BLD-RTO: 0 PER 100 WBC
NRBC BLD-RTO: 0 PER 100 WBC
PH UR STRIP: 5.5 [PH] (ref 5–8)
PLATELET # BLD AUTO: 225 K/UL (ref 150–400)
PLATELET # BLD AUTO: 255 K/UL (ref 150–400)
PMV BLD AUTO: 10.4 FL (ref 8.9–12.9)
PMV BLD AUTO: 10.9 FL (ref 8.9–12.9)
POTASSIUM SERPL-SCNC: 3.6 MMOL/L (ref 3.5–5.1)
PROT SERPL-MCNC: 6.2 G/DL (ref 6.4–8.2)
PROT UR STRIP-MCNC: ABNORMAL MG/DL
PROT UR-MCNC: 25 MG/DL (ref 0–11.9)
PROT/CREAT UR-RTO: 0.3
RBC # BLD AUTO: 3.33 M/UL (ref 3.8–5.2)
RBC # BLD AUTO: 3.5 M/UL (ref 3.8–5.2)
RBC #/AREA URNS HPF: ABNORMAL /HPF (ref 0–5)
SODIUM SERPL-SCNC: 137 MMOL/L (ref 136–145)
SP GR UR REFRACTOMETRY: 1.01 (ref 1–1.03)
TSH SERPL DL<=0.05 MIU/L-ACNC: 1.09 UIU/ML (ref 0.36–3.74)
UA: UC IF INDICATED,UAUC: ABNORMAL
URATE SERPL-MCNC: 2.9 MG/DL (ref 2.6–6)
UROBILINOGEN UR QL STRIP.AUTO: 0.2 EU/DL (ref 0.2–1)
WBC # BLD AUTO: 11.2 K/UL (ref 3.6–11)
WBC # BLD AUTO: 11.4 K/UL (ref 3.6–11)
WBC URNS QL MICRO: ABNORMAL /HPF (ref 0–4)

## 2022-09-23 PROCEDURE — 74011250637 HC RX REV CODE- 250/637: Performed by: OBSTETRICS & GYNECOLOGY

## 2022-09-23 PROCEDURE — 99285 EMERGENCY DEPT VISIT HI MDM: CPT

## 2022-09-23 PROCEDURE — 83615 LACTATE (LD) (LDH) ENZYME: CPT

## 2022-09-23 PROCEDURE — 80053 COMPREHEN METABOLIC PANEL: CPT

## 2022-09-23 PROCEDURE — 85025 COMPLETE CBC W/AUTO DIFF WBC: CPT

## 2022-09-23 PROCEDURE — 36415 COLL VENOUS BLD VENIPUNCTURE: CPT

## 2022-09-23 PROCEDURE — 84156 ASSAY OF PROTEIN URINE: CPT

## 2022-09-23 PROCEDURE — 81001 URINALYSIS AUTO W/SCOPE: CPT

## 2022-09-23 PROCEDURE — 84550 ASSAY OF BLOOD/URIC ACID: CPT

## 2022-09-23 RX ORDER — BUTALBITAL, ACETAMINOPHEN AND CAFFEINE 50; 325; 40 MG/1; MG/1; MG/1
2 TABLET ORAL
Status: DISCONTINUED | OUTPATIENT
Start: 2022-09-23 | End: 2022-09-24 | Stop reason: HOSPADM

## 2022-09-23 RX ADMIN — BUTALBITAL, ACETAMINOPHEN, AND CAFFEINE 2 TABLET: 50; 325; 40 TABLET ORAL at 20:15

## 2022-09-23 NOTE — ROUTINE PROCESS
1924: Patient arrived ambulatory to [facility] with complaints of \"seeing spots\" intermittently today, headache on and off for which she took midol for, and elevated blood pressures at home (140's over 80s per patient). Patient declines having a headache or visual disturbances at this time. Reoprts positive fetal movement, denies leaking of fluid or vaginal bleeding. 1933: Call to Dr Jennifer Schroeder to report patient arrival. Orders received for [facility] labs and serial blood pressures.

## 2022-09-24 NOTE — PROGRESS NOTES
1945 Bedside and Verbal shift change report given to Jazmyn RNC (oncoming nurse) by Daniel Ramos RN (offgoing nurse). Report included the following information SBAR, Kardex, Intake/Output, MAR, Accordion, Recent Results, and Med Rec Status. 2000: Dr Jennifer Schroeder at bedside to see patient and discuss plan of care. 2055: Dr Jennifer Schroeder called, labs reviewed. Patient may be discharged home at this time. [id number] Patient discharged home, undelivered. Discharge instructions reviewed with patient and mother. Copy of discharge instructions given to patient to take home. Follow up with CNM on Monday. Signature obtained on electronic pad. No further questions or complaints at this time.

## 2022-09-24 NOTE — ED PROVIDER NOTES
33 y/o G1 @ 27 weeks 4 days presents with c/o intermittent HA, elevated BPs at home, an episode of vomiting  Gives H/O migraines  States she was seen in the office yesterday and her BPs were elevated and she was advised to get a BP cuff and monitor BPs at home  Her urine protein/cr ratio was 0.2 yesterday and all PIH labs were within normal limits      No contractions, no LOF, no vaginal bleeding  AFM      Visual Problems   Associated symptoms include photophobia and vomiting. Chief Complaint   Patient presents with    Visual Problems     Pt complains of intermittent seeing spots, has had a headache on and off.  States she had high blood pressures at home       Past Medical History:   Diagnosis Date    Anxiety     Cholesteatoma of right ear     Depression        Past Surgical History:   Procedure Laterality Date    HX BREAST REDUCTION  2012    HX OTHER SURGICAL      right ear surgery 5 times    HX WISDOM TEETH EXTRACTION           Family History:   Problem Relation Age of Onset    Heart Disease Mother     Diabetes Mother     Ovarian Cancer Other        Social History     Socioeconomic History    Marital status:      Spouse name: Not on file    Number of children: Not on file    Years of education: Not on file    Highest education level: Not on file   Occupational History    Not on file   Tobacco Use    Smoking status: Never    Smokeless tobacco: Never   Vaping Use    Vaping Use: Not on file   Substance and Sexual Activity    Alcohol use: Not Currently    Drug use: Never    Sexual activity: Yes     Partners: Male     Birth control/protection: None   Other Topics Concern    Not on file   Social History Narrative    Not on file     Social Determinants of Health     Financial Resource Strain: Not on file   Food Insecurity: Not on file   Transportation Needs: Not on file   Physical Activity: Not on file   Stress: Not on file   Social Connections: Not on file   Intimate Partner Violence: Not on file Housing Stability: Not on file         ALLERGIES: Pcn [penicillins]    Review of Systems   Constitutional: Negative. Eyes:  Positive for photophobia. Gastrointestinal:  Positive for vomiting. Neurological:  Positive for headaches. All other systems reviewed and are negative. Vitals:    09/23/22 1928 09/23/22 1944 09/23/22 1959   BP: (!) 147/73 135/65 126/76   Pulse: 92 96 98   Resp: 20     Temp: 98.1 °F (36.7 °C)              Physical Exam  Vitals and nursing note reviewed. Exam conducted with a chaperone present. Constitutional:       Appearance: Normal appearance. HENT:      Head: Normocephalic and atraumatic. Right Ear: Tympanic membrane normal.      Left Ear: Tympanic membrane normal.      Nose: Nose normal.      Mouth/Throat:      Pharynx: Oropharynx is clear. Eyes:      Extraocular Movements: Extraocular movements intact. Pupils: Pupils are equal, round, and reactive to light. Cardiovascular:      Rate and Rhythm: Normal rate and regular rhythm. Pulses: Normal pulses. Heart sounds: Normal heart sounds. Pulmonary:      Effort: Pulmonary effort is normal.      Breath sounds: Normal breath sounds. Abdominal:      Comments: Gravid, relaxed   Genitourinary:     Comments: deferred  Musculoskeletal:         General: Normal range of motion. Cervical back: Normal range of motion and neck supple. Neurological:      General: No focal deficit present. Mental Status: She is alert and oriented to person, place, and time.    Psychiatric:         Mood and Affect: Mood normal.         Behavior: Behavior normal.        MDM  Number of Diagnoses or Management Options  Diagnosis management comments: IUP @ 27 weeks 4 days with HA and episode of vomiting  H/O Migraines    , +accels, moderate variability, no decels  [place] No contractions      A/P IUP @ 27 weeks 4 days with HA and episode of vomiting  Cat 1 NST  Serial BPs- 147/73,135/65,126/76,130/79  PIH labs  Fioricet PO PRN for Gunnar Bishop MD

## 2022-09-24 NOTE — DISCHARGE INSTRUCTIONS
High Blood Pressure in Pregnancy: Care Instructions  Overview     High blood pressure (hypertension) means that the force of blood against your artery walls is too strong. High blood pressure problems during pregnancy include:  Chronic hypertension. This is high blood pressure that starts before pregnancy. Gestational hypertension. This is high blood pressure that starts in the second or third trimester of pregnancy. Preeclampsia. This is a problem that includes high blood pressure and signs of organ injury during pregnancy. In some cases, it leads to eclampsia. Eclampsia causes seizures. High blood pressure during pregnancy can affect the amount of oxygen and nutrients your baby receives. This can affect how your baby grows. High blood pressure can also cause other serious problems for both you and your baby, such as placental abruption. To prevent problems, you and your baby will be watched very closely. You will have to check your blood pressure often during pregnancy and possibly after pregnancy. If your blood pressure rises suddenly or is very high during your pregnancy, your doctor may prescribe medicines. They can usually control blood pressure. If your blood pressure affects your or your baby's health, your doctor may need to deliver your baby early. After your baby is born, your blood pressure will probably improve. But sometimes blood pressure problems continue after birth. Follow-up care is a key part of your treatment and safety. Be sure to make and go to all appointments, and call your doctor if you are having problems. It's also a good idea to know your test results and keep a list of the medicines you take. How can you care for yourself at home? Take and write down your blood pressure at home if your doctor says to. Take your medicines exactly as prescribed. Call your doctor if you think you are having a problem with your medicine. Do not smoke.  This is one of the best things you can do to help your baby be healthy. If you need help quitting, talk to your doctor about stop-smoking programs and medicines. These can increase your chances of quitting for good. Don't gain too much weight during pregnancy. Talk to your doctor about how much weight gain is healthy. Eat a healthy diet. If your doctor says it's okay, get regular exercise. Walking or swimming several times a week can be healthy for you and your baby. Reduce stress, and find time to relax. This is very important if you continue to work or have a busy schedule. It's also important if you have small children at home. When should you call for help? Share this information with your partner or a friend. They can help you watch for warning signs. Call 911  anytime you think you may need emergency care. For example, call if:    You passed out (lost consciousness). You have a seizure. Call your doctor now or seek immediate medical care if:    You have symptoms of preeclampsia, such as:  Sudden swelling of your face, hands, or feet. New vision problems (such as dimness, blurring, or seeing spots). A severe headache. Your blood pressure is very high, such as 160/110 or higher. Your blood pressure is higher than your doctor told you it should be, or it rises quickly. You have new nausea or vomiting. You think that you are in labor. You have pain in your belly or pelvis. Watch closely for changes in your health, and be sure to contact your doctor if:    You gain weight rapidly. Where can you learn more? Go to http://cami-nishant.info/  Enter A052 in the search box to learn more about \"High Blood Pressure in Pregnancy: Care Instructions. \"  Current as of: June 16, 2021               Content Version: 13.2  © 3353-4284 [employer]. Care instructions adapted under license by "[employer] (which disclaims liability or warranty for this information).  If you have questions about a medical condition or this instruction, always ask your healthcare professional. [place] [address] any warranty or liability for your use of this information. Weeks 26 to 30 of Your Pregnancy: Care Instructions  Overview     You are now entering your last trimester of pregnancy. Your baby is growing quickly. Nichelle Mustafa probably feel your baby moving around more often. Your doctor may ask you to count your baby's kicks. Your back may ache as your body gets used to your baby's size and length. If you haven't already had the Tdap shot during this pregnancy, talk to your doctor about getting it. It will help protect your  against pertussis infection. During this time, it's important to take care of yourself and pay attention to what your body needs. If you feel sexual, you can explore ways to be close with your partner that match your comfort and desire. Follow-up care is a key part of your treatment and safety. Be sure to make and go to all appointments, and call your doctor if you are having problems. It's also a good idea to know your test results and keep a list of the medicines you take. How can you care for yourself at home? Take it easy at work  Take frequent breaks. If possible, stop working when you are tired, and rest during your lunch hour. Take bathroom breaks every 2 hours. Change positions often. If you sit for long periods, stand up and walk around. When you stand for a long time, keep one foot on a low stool with your knee bent. After standing a lot, sit with your feet up. Avoid fumes, chemicals, and tobacco smoke. Be sexual in your own way  Having sex during pregnancy is okay, unless your doctor tells you not to. You may be very interested in sex, or you may have no interest at all. Your growing belly can make it hard to find a good position during intercourse. [place] and explore. You may get cramps in your uterus when your partner touches your breasts.   A back rub may relieve the backache or cramps that sometimes follow orgasm. Learn about  labor  Watch for signs of  labor. You may be going into labor if:  You have menstrual-like cramps, with or without nausea. You have about 6 or more contractions in 1 hour, even after you have had a glass of water and are resting. You have a low, dull backache that does not go away when you change your position. You have pain or pressure in your pelvis that comes and goes in a pattern. You have intestinal cramping or flu-like symptoms, with or without diarrhea. You notice an increase or change in your vaginal discharge. Discharge may be heavy, mucus-like, watery, or streaked with blood. Your water breaks. If you think you have  labor:  Drink 2 or 3 glasses of water or juice. Not drinking enough fluids can cause contractions. Stop what you are doing, and empty your bladder. Then lie down on your left side for at least 1 hour. While lying on your side, find your breast bone. Put your fingers in the soft spot just below it. Move your fingers down toward your belly button to find the top of your uterus. Check to see if it is tight. Contractions can be weak or strong. Record your contractions for an hour. Time a contraction from the start of one contraction to the start of the next one. Single or several strong contractions without a pattern are called [place] contractions. They are practice contractions but not the start of labor. They often stop if you change what you are doing. Call your doctor if you have regular contractions. Where can you learn more? Go to http://www.gray.com/  Enter Z303 in the search box to learn more about \"Weeks 26 to 30 of Your Pregnancy: Care Instructions. \"  Current as of: 2021               Content Version: 13.2  © 1792-3188 [employer].    Care instructions adapted under license by [employer] (which disclaims liability or warranty for this information). If you have questions about a medical condition or this instruction, always ask your healthcare professional. [address] any warranty or liability for your use of this information     Pregnancy Precautions: Care Instructions  Your Care Instructions     There is no sure way to prevent labor before your due date ( labor) or to prevent most other pregnancy problems. But there are things you can do to increase your chances of a healthy pregnancy. Go to your appointments, follow your doctor's advice, and take good care of yourself. Eat well, and exercise (if your doctor agrees). And make sure to drink plenty of water. Follow-up care is a key part of your treatment and safety. Be sure to make and go to all appointments, and call your doctor if you are having problems. It's also a good idea to know your test results and keep a list of the medicines you take. How can you care for yourself at home? Make sure you go to your prenatal appointments. At each visit, your doctor will check your blood pressure. Your doctor will also check to see if you have protein in your urine. High blood pressure and protein in urine are signs of preeclampsia. This condition can be dangerous for you and your baby. Drink plenty of fluids. Dehydration can cause contractions. If you have kidney, heart, or liver disease and have to limit fluids, talk with your doctor before you increase the amount of fluids you drink. Tell your doctor right away if you notice any symptoms of an infection, such as:  Burning when you urinate. A foul-smelling discharge from your vagina. Vaginal itching. Unexplained fever. Unusual pain or soreness in your uterus or lower belly. Eat a balanced diet. Include plenty of foods that are high in calcium and iron. Foods high in calcium include milk, cheese, yogurt, almonds, and broccoli.   Foods high in iron include red meat, shellfish, poultry, eggs, beans, raisins, whole-grain bread, and leafy green vegetables. Do not smoke. If you need help quitting, talk to your doctor about stop-smoking programs and medicines. These can increase your chances of quitting for good. Do not drink alcohol or use marijuana or illegal drugs. Follow your doctor's directions about activity. Your doctor will let you know how much, if any, exercise you can do. Ask your doctor if you can have sex. If you are at risk for early labor, your doctor may ask you to not have sex. Take care to prevent falls. During pregnancy, your joints are loose, and your balance is off. Sports such as bicycling, skiing, or in-line skating can increase your risk of falling. And don't ride horses or motorcycles, dive, water ski, scuba dive, or parachute jump while you are pregnant. Avoid things that can make your body too hot and may be harmful to your baby, such as a hot tub or sauna. Or talk with your doctor before doing anything that raises your body temperature. Your doctor can tell you if it's safe. Do not take any over-the-counter or herbal medicines or supplements without talking to your doctor or pharmacist first.  When should you call for help? Call 911  anytime you think you may need emergency care. For example, call if:    You passed out (lost consciousness). You have a seizure. You have severe vaginal bleeding. You have severe pain in your belly or pelvis. You have had fluid gushing or leaking from your vagina and you know or think the umbilical cord is bulging into your vagina. If this happens, immediately get down on your knees so your rear end (buttocks) is higher than your head. This will decrease the pressure on the cord until help arrives. Call your doctor now or seek immediate medical care if:    You have signs of preeclampsia, such as:  Sudden swelling of your face, hands, or feet.   New vision problems (such as dimness, blurring, or seeing spots). A severe headache. You have any vaginal bleeding. You have belly pain or cramping. You have a fever. You have had regular contractions (with or without pain) for an hour. This means that you have 8 or more within 1 hour or 4 or more in 20 minutes after you change your position and drink fluids. You have a sudden release of fluid from your vagina. You have low back pain or pelvic pressure that does not go away. You notice that your baby has stopped moving or is moving much less than normal.   Watch closely for changes in your health, and be sure to contact your doctor if you have any problems. Where can you learn more? Go to http://www.hernandez.com/  Enter Y951 in the search box to learn more about \"Pregnancy Precautions: Care Instructions. \"  Current as of: June 16, 2021               Content Version: 13.2  © 2426-1512 [employer]. Care instructions adapted under license by [employer] (which disclaims liability or warranty for this information). If you have questions about a medical condition or this instruction, always ask your healthcare professional. [address] any warranty or liability for your use of this information.

## 2022-09-26 ENCOUNTER — ROUTINE PRENATAL (OUTPATIENT)
Dept: OBGYN CLINIC | Age: 32
End: 2022-09-26
Payer: COMMERCIAL

## 2022-09-26 VITALS — WEIGHT: 208.6 LBS | DIASTOLIC BLOOD PRESSURE: 94 MMHG | SYSTOLIC BLOOD PRESSURE: 118 MMHG | BODY MASS INDEX: 32.67 KG/M2

## 2022-09-26 DIAGNOSIS — Z34.90 PREGNANCY, UNSPECIFIED GESTATIONAL AGE: Primary | ICD-10-CM

## 2022-09-26 DIAGNOSIS — Z3A.12 12 WEEKS GESTATION OF PREGNANCY: ICD-10-CM

## 2022-09-26 DIAGNOSIS — Z36.9 UNSPECIFIED ANTENATAL SCREENING: ICD-10-CM

## 2022-09-26 LAB — T PALLIDUM AB SER QL IA: NON REACTIVE

## 2022-09-26 PROCEDURE — 0502F SUBSEQUENT PRENATAL CARE: CPT | Performed by: MIDWIFE

## 2022-09-26 NOTE — PROGRESS NOTES
Patient states that Friday she went to ED due to her feeling sick, vomiting, joint pain, fatigue, discomfort sleeping. Pt states today she is just feels worn out and in a lot discomfort and feeling full constant. Reviewed lab work pc ratio 0.3  Continue modified bed rest at home. Do not go into office for work. Take BPs at home BID, or with HA. Report if consistently 140/90 or greater. Start 81mg ASA. Referral to [facility]  Reviewed GTT. Will need 3 hour test.  Instructions given.   KRIS 2 weeks

## 2022-09-29 ENCOUNTER — TELEPHONE (OUTPATIENT)
Dept: OBGYN CLINIC | Age: 32
End: 2022-09-29

## 2022-09-29 ENCOUNTER — LAB ONLY (OUTPATIENT)
Dept: OBGYN CLINIC | Age: 32
End: 2022-09-29

## 2022-09-29 DIAGNOSIS — O99.810 ABNORMAL GLUCOSE TOLERANCE TEST IN PREGNANCY: Primary | ICD-10-CM

## 2022-09-29 NOTE — TELEPHONE ENCOUNTER
----- Message from Parker Doss CNM sent at 9/29/2022  4:14 PM EDT -----  Rene Wilcox   Your one hour test is unfortunately a little higher than we like to see. This is a risk assessment only, in order to see if you have gestational diabetes we would like for you to do a three hour test.   Our nurse will reach out to you in the next 24-48 hours to help you set an appointment and answer any questions you may have. If you would like to call sooner to make a lab apt with our office please call [phone], request a three hour glucose lab appointment. You will need to fast in the morning and come to the office at between 8-830, you will have your fasting blood sugar drawn upon arrival,then be given a larger dose of the sugar drink you had for the one hour test. Your blood will be draw at one, two and three hours after finishing the drink. You can drink water during this time, but please do not eat. Bring a snack for immediately after the test, in case you are not feeling well after a longer fast.     If you have any questions or concerns please let me know.      Regards,    Midwife Team

## 2022-09-29 NOTE — PROGRESS NOTES
Sylvester Marina   Your one hour test is unfortunately a little higher than we like to see. This is a risk assessment only, in order to see if you have gestational diabetes we would like for you to do a three hour test.   Our nurse will reach out to you in the next 24-48 hours to help you set an appointment and answer any questions you may have. If you would like to call sooner to make a lab apt with our office please call [phone], request a three hour glucose lab appointment. You will need to fast in the morning and come to the office at between 8-830, you will have your fasting blood sugar drawn upon arrival,then be given a larger dose of the sugar drink you had for the one hour test. Your blood will be draw at one, two and three hours after finishing the drink. You can drink water during this time, but please do not eat. Bring a snack for immediately after the test, in case you are not feeling well after a longer fast.     If you have any questions or concerns please let me know.      Regards,    Midwife Team

## 2022-09-30 LAB
GESTATIONAL 3HR GTT,GESTA: ABNORMAL
GLUCOSE 1H P 100 G GLC PO SERPL-MCNC: 172 MG/DL (ref 65–180)
GLUCOSE P FAST SERPL-MCNC: 92 MG/DL (ref 65–95)
GLUCOSE, 2 HR,GSTT2: 174 MG/DL (ref 65–155)
GLUCOSE, 3 HR,GSTT3: 167 MG/DL (ref 65–140)

## 2022-10-03 DIAGNOSIS — O24.419 GESTATIONAL DIABETES MELLITUS (GDM), ANTEPARTUM, GESTATIONAL DIABETES METHOD OF CONTROL UNSPECIFIED: Primary | ICD-10-CM

## 2022-10-06 ENCOUNTER — CLINICAL SUPPORT (OUTPATIENT)
Dept: DIABETES SERVICES | Age: 32
End: 2022-10-06
Payer: COMMERCIAL

## 2022-10-06 DIAGNOSIS — O24.419 GESTATIONAL DIABETES MELLITUS (GDM), ANTEPARTUM, GESTATIONAL DIABETES METHOD OF CONTROL UNSPECIFIED: Primary | ICD-10-CM

## 2022-10-06 PROCEDURE — G0108 DIAB MANAGE TRN  PER INDIV: HCPCS | Performed by: DIETITIAN, REGISTERED

## 2022-10-06 NOTE — PROGRESS NOTES
[facility]  Diabetes Self-Management Education & Support Program  Encounter Note    SUMMARY  Diabetes self-care management training was completed related to new dx gestational diabetes. The participant will return on October 24 to continue DSMES related to healthy eating and monitoring. EVALUATION:  Bernardino Mata is 29+ week primigravida with new onset GDM with borderline preclampsia. Family hx of GDM and Type 2 DM. Weight gain for pregnancy >40 lb from pregravid weight and will expect minimum weight gain over next 10 weeks. Instructed on management of GDM: including SMBG, diet and activity/exercise as permitted by providers. Discussed including stress management techniques to help with both GDM and HTN. RECOMMENDATIONS:  1) obtain testing supplies - need Rx for x4 per day test strips and lancets along with a glucometer kit for the covered meter. 2) test fasting and 2 hours post meal timed from first bite. 3) avoid regular soda and juices. 4) start implementing meal plan - if you notice more than 2 BG results outside of guidelines after Monday, October 10th - reach out by [employer] or phone for review with HAYDEN JOSÉ. 5) small amounts of activity after meals can improve insulin resistance and help improve BG results - do so within exercise guidelines from provider. TOPICS DISCUSSED TODAY:  WHAT CAN I EAT? 30  HOW CAN BLOOD GLUCOSE MONITORING HELP ME? 30      Next provider visit is scheduled for 10/10/22       SMART GOAL(S)   Obtain meter and testing supplies to begin testing x4 per day. ACHIEVEMENT OF GOAL(S) : 0-24%         DATE DSMES TOPIC EVALUATION     10/6/2022 WHAT CAN I EAT?    General principles   Determining a healthy weight   Nutritional terms & tools   Healthy Plate method   Carbohydrate Counting   Reading food labels   Free apps   Pregnancy recommendations      The participant   Uses Healthy Plate principles in constructing meals: No  Reads food labels in choosing acceptable foods: Yes    The participant needs to address starting her GDM meal pattern. Instructed to avoid all sweet beverages with exception of milk. Discussed options for her Coke/caffeine source. Instructed to avoid fruit am - Mathieu Lewis shared that she does not eat very much fruit. Instructed on meal pattern with breakfast: 30g, Lunch and Dinner 45g and snacks per her routine 15-30 g for total of ~ 170 g carbohydrate. Advised that protein, non starchy veg and added fats do not contribute to large amounts of carbohydrate and to not worry about affecting BG. Discussed how to adapt food choices to improve glycemic control and encouraged whole grains. Mathieu Lewis is taking her PVN evening to  and is using Tums for managing heart burn - should meet recommended calcium needs for pregnancy most days with her yogurt intakes. DATE DSMES TOPIC EVALUATION     10/6/2022 HOW CAN BLOOD GLUCOSE MONITORING HELP ME? Value of blood glucose monitoring   Realistic expectations   Blood glucose monitoring targets   Target adjustments   Setting a1c & blood glucose targets with provider   Meter selection    Technique for obtaining blood droplet   Blood glucose testing sites   Determining best times to test   Pregnancy recommendations   Data sharing with provider        The participant   Can demonstrate their glucometer procedure: No  Logs their BG readings:  No    The participant needs to address contact her health insurer to establish if she has a preferred meter with her policy benefits. Contact provider with meter company and arrange for testing supplies for testing fasting and post prandial x3. Based on her 3 hr GTT will test fasting and 2 hours post prandial.     Yuliet Lau RD, [facility] on 10/6/2022 at 11:43 AM    I have personally reviewed the health record, including provider notes, laboratory data and current medications before making these care and education recommendations.  The time spent in this effort is included in the total time.   Total minutes: 65

## 2022-10-10 ENCOUNTER — ROUTINE PRENATAL (OUTPATIENT)
Dept: OBGYN CLINIC | Age: 32
End: 2022-10-10
Payer: COMMERCIAL

## 2022-10-10 VITALS — DIASTOLIC BLOOD PRESSURE: 70 MMHG | WEIGHT: 210.6 LBS | SYSTOLIC BLOOD PRESSURE: 132 MMHG | BODY MASS INDEX: 32.98 KG/M2

## 2022-10-10 DIAGNOSIS — Z3A.30 30 WEEKS GESTATION OF PREGNANCY: ICD-10-CM

## 2022-10-10 DIAGNOSIS — R00.0 TACHYCARDIA: ICD-10-CM

## 2022-10-10 DIAGNOSIS — O24.419 GESTATIONAL DIABETES MELLITUS (GDM) IN THIRD TRIMESTER, GESTATIONAL DIABETES METHOD OF CONTROL UNSPECIFIED: ICD-10-CM

## 2022-10-10 DIAGNOSIS — Z36.9 UNSPECIFIED ANTENATAL SCREENING: Primary | ICD-10-CM

## 2022-10-10 PROCEDURE — 0502F SUBSEQUENT PRENATAL CARE: CPT | Performed by: ADVANCED PRACTICE MIDWIFE

## 2022-10-10 RX ORDER — LANCETS
EACH MISCELLANEOUS
Qty: 100 EACH | Refills: 5 | Status: SHIPPED | OUTPATIENT
Start: 2022-10-10

## 2022-10-10 RX ORDER — INSULIN PUMP SYRINGE, 3 ML
EACH MISCELLANEOUS
Qty: 1 KIT | Refills: 0 | Status: SHIPPED | OUTPATIENT
Start: 2022-10-10

## 2022-10-10 NOTE — PROGRESS NOTES
Feeling like heart rate is rapid with minimal exercise (walking) and just when sitting  Has never had Cardiac problems or eval  Denies CP with tachycardia, watch says HR is 120-160  Has not rec'd Glucometer yet, so has not started checking BG  Seen by DM last wk and given diet and instructions, has appt next wk  Reports BPs @ home labile diastolic's 00'Z-64'R  Last PCR 0.3  Repeat labs today  MFM this week for GDM and mild Pre-E  Cards referral  Denies pregnancy problems, GFM, no PTL s/sx

## 2022-10-11 LAB
ALBUMIN SERPL-MCNC: 3 G/DL (ref 3.5–5)
ALBUMIN/GLOB SERPL: 0.9 {RATIO} (ref 1.1–2.2)
ALP SERPL-CCNC: 119 U/L (ref 45–117)
ALT SERPL-CCNC: 22 U/L (ref 12–78)
ANION GAP SERPL CALC-SCNC: 8 MMOL/L (ref 5–15)
AST SERPL-CCNC: 18 U/L (ref 15–37)
BILIRUB SERPL-MCNC: 0.3 MG/DL (ref 0.2–1)
BUN SERPL-MCNC: 7 MG/DL (ref 6–20)
BUN/CREAT SERPL: 12 (ref 12–20)
CALCIUM SERPL-MCNC: 9.3 MG/DL (ref 8.5–10.1)
CHLORIDE SERPL-SCNC: 104 MMOL/L (ref 97–108)
CO2 SERPL-SCNC: 26 MMOL/L (ref 21–32)
CREAT SERPL-MCNC: 0.6 MG/DL (ref 0.55–1.02)
CREAT UR-MCNC: 195 MG/DL
ERYTHROCYTE [DISTWIDTH] IN BLOOD BY AUTOMATED COUNT: 13.9 % (ref 11.5–14.5)
GLOBULIN SER CALC-MCNC: 3.4 G/DL (ref 2–4)
GLUCOSE SERPL-MCNC: 99 MG/DL (ref 65–100)
HCT VFR BLD AUTO: 32.3 % (ref 35–47)
HGB BLD-MCNC: 10 G/DL (ref 11.5–16)
LDH SERPL L TO P-CCNC: 170 U/L (ref 81–246)
MCH RBC QN AUTO: 29.6 PG (ref 26–34)
MCHC RBC AUTO-ENTMCNC: 31 G/DL (ref 30–36.5)
MCV RBC AUTO: 95.6 FL (ref 80–99)
NRBC # BLD: 0 K/UL (ref 0–0.01)
NRBC BLD-RTO: 0 PER 100 WBC
PLATELET # BLD AUTO: 242 K/UL (ref 150–400)
PMV BLD AUTO: 10.9 FL (ref 8.9–12.9)
POTASSIUM SERPL-SCNC: 4 MMOL/L (ref 3.5–5.1)
PROT SERPL-MCNC: 6.4 G/DL (ref 6.4–8.2)
PROT UR-MCNC: 30 MG/DL (ref 0–11.9)
PROT/CREAT UR-RTO: 0.2
RBC # BLD AUTO: 3.38 M/UL (ref 3.8–5.2)
SODIUM SERPL-SCNC: 138 MMOL/L (ref 136–145)
URATE SERPL-MCNC: 3.4 MG/DL (ref 2.6–6)
WBC # BLD AUTO: 11 K/UL (ref 3.6–11)

## 2022-10-13 ENCOUNTER — HOSPITAL ENCOUNTER (OUTPATIENT)
Dept: PERINATAL CARE | Age: 32
Discharge: HOME | End: 2022-10-13
Attending: OBSTETRICS & GYNECOLOGY
Payer: COMMERCIAL

## 2022-10-13 ENCOUNTER — APPOINTMENT (OUTPATIENT)
Dept: CT IMAGING | Age: 32
End: 2022-10-13
Attending: MIDWIFE
Payer: COMMERCIAL

## 2022-10-13 ENCOUNTER — HOSPITAL ENCOUNTER (OUTPATIENT)
Age: 32
Discharge: HOME | End: 2022-10-13
Attending: MIDWIFE | Admitting: OBSTETRICS & GYNECOLOGY
Payer: COMMERCIAL

## 2022-10-13 VITALS
BODY MASS INDEX: 32.96 KG/M2 | RESPIRATION RATE: 16 BRPM | WEIGHT: 210 LBS | TEMPERATURE: 98.5 F | OXYGEN SATURATION: 96 % | HEART RATE: 98 BPM | HEIGHT: 67 IN | SYSTOLIC BLOOD PRESSURE: 133 MMHG | DIASTOLIC BLOOD PRESSURE: 73 MMHG

## 2022-10-13 LAB
ALBUMIN SERPL-MCNC: 3.1 G/DL (ref 3.5–5)
ALBUMIN/GLOB SERPL: 0.8 {RATIO} (ref 1.1–2.2)
ALP SERPL-CCNC: 125 U/L (ref 45–117)
ALT SERPL-CCNC: 20 U/L (ref 12–78)
ANION GAP SERPL CALC-SCNC: 8 MMOL/L (ref 5–15)
APPEARANCE UR: ABNORMAL
AST SERPL-CCNC: 15 U/L (ref 15–37)
BACTERIA URNS QL MICRO: NEGATIVE /HPF
BASOPHILS # BLD: 0 K/UL (ref 0–0.1)
BASOPHILS NFR BLD: 0 % (ref 0–1)
BILIRUB SERPL-MCNC: 0.3 MG/DL (ref 0.2–1)
BILIRUB UR QL: NEGATIVE
BUN SERPL-MCNC: 7 MG/DL (ref 6–20)
BUN/CREAT SERPL: 11 (ref 12–20)
CALCIUM SERPL-MCNC: 9.5 MG/DL (ref 8.5–10.1)
CHLORIDE SERPL-SCNC: 105 MMOL/L (ref 97–108)
CO2 SERPL-SCNC: 24 MMOL/L (ref 21–32)
COLOR UR: YELLOW
CREAT SERPL-MCNC: 0.64 MG/DL (ref 0.55–1.02)
CREAT UR-MCNC: 79.7 MG/DL
D DIMER PPP FEU-MCNC: 1.29 MG/L FEU (ref 0–0.65)
DIFFERENTIAL METHOD BLD: ABNORMAL
EOSINOPHIL # BLD: 0 K/UL (ref 0–0.4)
EOSINOPHIL NFR BLD: 0 % (ref 0–7)
EPITH CASTS URNS QL MICRO: ABNORMAL /LPF
ERYTHROCYTE [DISTWIDTH] IN BLOOD BY AUTOMATED COUNT: 13.7 % (ref 11.5–14.5)
GLOBULIN SER CALC-MCNC: 3.7 G/DL (ref 2–4)
GLUCOSE SERPL-MCNC: 85 MG/DL (ref 65–100)
GLUCOSE UR STRIP.AUTO-MCNC: NEGATIVE MG/DL
HCT VFR BLD AUTO: 32.9 % (ref 35–47)
HGB BLD-MCNC: 10.6 G/DL (ref 11.5–16)
HGB UR QL STRIP: NEGATIVE
IMM GRANULOCYTES # BLD AUTO: 0.2 K/UL (ref 0–0.04)
IMM GRANULOCYTES NFR BLD AUTO: 2 % (ref 0–0.5)
KETONES UR QL STRIP.AUTO: 80 MG/DL
LDH SERPL L TO P-CCNC: 168 U/L (ref 81–246)
LEUKOCYTE ESTERASE UR QL STRIP.AUTO: NEGATIVE
LYMPHOCYTES # BLD: 1.3 K/UL (ref 0.8–3.5)
LYMPHOCYTES NFR BLD: 12 % (ref 12–49)
MCH RBC QN AUTO: 29.4 PG (ref 26–34)
MCHC RBC AUTO-ENTMCNC: 32.2 G/DL (ref 30–36.5)
MCV RBC AUTO: 91.4 FL (ref 80–99)
MONOCYTES # BLD: 0.6 K/UL (ref 0–1)
MONOCYTES NFR BLD: 6 % (ref 5–13)
NEUTS SEG # BLD: 8.9 K/UL (ref 1.8–8)
NEUTS SEG NFR BLD: 80 % (ref 32–75)
NITRITE UR QL STRIP.AUTO: NEGATIVE
NRBC # BLD: 0 K/UL (ref 0–0.01)
NRBC BLD-RTO: 0 PER 100 WBC
PH UR STRIP: 5 [PH] (ref 5–8)
PLATELET # BLD AUTO: 234 K/UL (ref 150–400)
PMV BLD AUTO: 10.5 FL (ref 8.9–12.9)
POTASSIUM SERPL-SCNC: 4.3 MMOL/L (ref 3.5–5.1)
PROT SERPL-MCNC: 6.8 G/DL (ref 6.4–8.2)
PROT UR STRIP-MCNC: ABNORMAL MG/DL
PROT UR-MCNC: 22 MG/DL (ref 0–11.9)
PROT/CREAT UR-RTO: 0.3
RBC # BLD AUTO: 3.6 M/UL (ref 3.8–5.2)
RBC #/AREA URNS HPF: ABNORMAL /HPF (ref 0–5)
SODIUM SERPL-SCNC: 137 MMOL/L (ref 136–145)
SP GR UR REFRACTOMETRY: 1.01 (ref 1–1.03)
UA: UC IF INDICATED,UAUC: ABNORMAL
URATE SERPL-MCNC: 3.7 MG/DL (ref 2.6–6)
UROBILINOGEN UR QL STRIP.AUTO: 0.2 EU/DL (ref 0.2–1)
WBC # BLD AUTO: 11.1 K/UL (ref 3.6–11)
WBC URNS QL MICRO: ABNORMAL /HPF (ref 0–4)

## 2022-10-13 PROCEDURE — 85025 COMPLETE CBC W/AUTO DIFF WBC: CPT

## 2022-10-13 PROCEDURE — 71275 CT ANGIOGRAPHY CHEST: CPT

## 2022-10-13 PROCEDURE — 76805 OB US >/= 14 WKS SNGL FETUS: CPT | Performed by: OBSTETRICS & GYNECOLOGY

## 2022-10-13 PROCEDURE — 74011000636 HC RX REV CODE- 636: Performed by: RADIOLOGY

## 2022-10-13 PROCEDURE — 84550 ASSAY OF BLOOD/URIC ACID: CPT

## 2022-10-13 PROCEDURE — 85379 FIBRIN DEGRADATION QUANT: CPT

## 2022-10-13 PROCEDURE — 99285 EMERGENCY DEPT VISIT HI MDM: CPT

## 2022-10-13 PROCEDURE — 84156 ASSAY OF PROTEIN URINE: CPT

## 2022-10-13 PROCEDURE — G0378 HOSPITAL OBSERVATION PER HR: HCPCS

## 2022-10-13 PROCEDURE — 83615 LACTATE (LD) (LDH) ENZYME: CPT

## 2022-10-13 PROCEDURE — 36415 COLL VENOUS BLD VENIPUNCTURE: CPT

## 2022-10-13 PROCEDURE — 80053 COMPREHEN METABOLIC PANEL: CPT

## 2022-10-13 PROCEDURE — 81001 URINALYSIS AUTO W/SCOPE: CPT

## 2022-10-13 RX ORDER — ACETAMINOPHEN 325 MG/1
650 TABLET ORAL
Status: DISCONTINUED | OUTPATIENT
Start: 2022-10-13 | End: 2022-10-14 | Stop reason: HOSPADM

## 2022-10-13 RX ORDER — ALBUTEROL SULFATE 90 UG/1
1 AEROSOL, METERED RESPIRATORY (INHALATION)
Qty: 18 G | Refills: 1 | Status: SHIPPED | OUTPATIENT
Start: 2022-10-13 | End: 2022-10-27

## 2022-10-13 RX ADMIN — IOPAMIDOL 80 ML: 755 INJECTION, SOLUTION INTRAVENOUS at 17:00

## 2022-10-13 NOTE — ED TRIAGE NOTES
1255-Received bedide arrival report(reason why client is here) from [facility]. 1305-Tiny VALDEZ at bedside talking with client and . 1315-Labs drawn without difficulty per CNM orders. 1330-pulse oximetry applied client does states she feels a little SOB. Lungs auscultated, client moving air well. O2 sats 95-97.  1445-Christiane VALDEZ informed of lab results. 1510-Client without any complaints at this time. Client has reviewed some of her lab results in her MyChart and had a few questions. I tried to answer her questions to the best of my ability also informed client and her  that I have talked to [address] [place] [address] about her lab results and that she will come and talk to her about what's next regarding her care. 1600-Christiane VALDEZ at bedside talking with client POC for the evening.

## 2022-10-13 NOTE — H&P
History & Physical    Name: Ronna Wilson MRN: [id number]  SSN: [id number]    YOB: 1990  Age: 34 y.o. Sex: female        Subjective:   Sumeet Bah is a 34 y.o. G1 @ 30.3 weeks GA. She was seen at [facility] today for a diagnosis of PreE and GDM. While at the appointment she c/o shortness of breath, headache and blurry vision. She was sent to L&D following appt for further evaluation. Report from Dr. Rene Coyne not available yet but there were no new concerns. Estimated Date of Delivery: 22  OB History          1    Para   0    Term   0       0    AB   0    Living   0         SAB   0    IAB   0    Ectopic   0    Molar   0    Multiple   0    Live Births   0                MsRosibel Ngo is admitted with pregnancy at 30w3d for  shortness of breath . Prenatal course was complicated by  GDM and PreE . Please see prenatal records for details. Patient Active Problem List    Diagnosis    12 weeks gestation of pregnancy     BP elevated for initial visit, recheck 2 wks___  Provider: Katarzyna Kennedy P0  EDC by LMP, confirmed with Early sono  Pertinents:  1. First Trimester bleeding of unknown etiology  2. NVP  3. TSH .21 Hyperthyroid--->Ref to Endocrine - no show 7/15, repeat TSH  normal, repeat with 3rd tri labs and again PP  4. [employer] referral sent in for zofran pump and IV hydration PRN, did not do d/t cost and not covered by insurance  5. Preeclampsia, Start 81mg ASA. Referral to [facility], Resent 10/10---> PCR 0.2, stable  6.  GDM, DM referral done, given Glucometer 10/10    Teaching checklist  13-21 weeks: complete- need to review dental care   24-28 weeks:  29-34 weeks:  35-37 weeks:  37-39 weeks:  40-42 weeks:    IOB labs: Apos, ABS Neg, Rub & VZV Immune, Urine Culture neg, Tpal Neg, Electrophoresis Normal  Genetic Screening: Pano/AFP Declined  Anatomy: Post placenta, normal growth, Female, normal andrews, 3VC  GTT: 167 3 hr GTT 92, 172, 174, 167  Flu:  TDAP: Declined , ask at next appt  Rhogam: Apos  Third Tri Labs:  GBS:  COVID: Maybe had Covid around [place] (outside of pregnancy), Vax x 2 Moderna and Boost 1/22    Pain mgmt. in labor:  Feeding:  Circ:  Social: Works mainly @ home for [employer] in [address] [employer]         No specialty comments available. Past Medical History:   Diagnosis Date    Anxiety     Cholesteatoma of right ear     Depression      Past Surgical History:   Procedure Laterality Date    HX BREAST REDUCTION  2012    HX OTHER SURGICAL      right ear surgery 5 times    HX WISDOM TEETH EXTRACTION       Social History     Occupational History    Not on file   Tobacco Use    Smoking status: Never    Smokeless tobacco: Never   Vaping Use    Vaping Use: Not on file   Substance and Sexual Activity    Alcohol use: Not Currently    Drug use: Never    Sexual activity: Yes     Partners: Male     Birth control/protection: None     Family History   Problem Relation Age of Onset    Heart Disease Mother     Diabetes Mother     Ovarian Cancer Other        Allergies   Allergen Reactions    Pcn [Penicillins] Hives     Prior to Admission medications    Medication Sig Start Date End Date Taking? Authorizing Provider   Blood-Glucose Meter monitoring kit QID testing, FBS, and 1 hr PP [facility] 10/10/22   Rebecca Woods CNM   glucose blood VI test strips strip Please provide test strips corresponding with glucometer that pt's insurance covers. QID testing. [facility] 10/10/22   Rebecca Woods CNM   lancets misc QID testing, [facility] 10/10/22   Rebecca Woods CNM   ondansetron (ZOFRAN ODT) 8 mg disintegrating tablet Take 1 Tablet by mouth every eight (8) hours as needed for Nausea or Vomiting. 7/13/22   Camillia JOSÉ MIGUEL Sandhu   ondansetron (ZOFRAN ODT) 4 mg disintegrating tablet Take 1 Tablet by mouth every eight (8) hours as needed for Nausea or Vomiting.  Indications: excessive vomiting in pregnancy 7/12/22   Niki Tenorio CNM   PNV Comb #2-Iron-Omega 3-FA [id number] mg cmpk Take  by mouth. Provider, Historical        Review of Systems: Constitutional: negative  Eyes: negative  Ears, nose, mouth, throat, and face: negative  Respiratory: positive for shortness of breath  Cardiovascular: positive for heart racing  Gastrointestinal: negative for change in bowel habits  Genitourinary:negative  Integument/breast: negative  Hematologic/lymphatic: negative  Musculoskeletal:negative  Neurological: negative  Behavioral/Psych: negative  Endocrine: negative  Allergic/Immunologic: negative  Blurry vision and headache    Objective:     Vitals:  Visit Vitals  /65   Pulse (!) 124   Temp 97.9 °F (36.6 °C)   Resp 18   Ht 5' 7\" (1.702 m)   Wt 210 lb (95.3 kg)   LMP 03/14/2022   SpO2 96%   BMI 32.89 kg/m²         Physical Exam:  Patient without distress. Heart: Regular rate and rhythm or tachycardic  Lung: clear to auscultation throughout lung fields and no wheezes, no rales, no rhonchi. Increased work of breathing without exertion  Abdomen: soft, nontender  Fundus: soft and non tender  Membranes:  Intact  Fetal Heart Rate: Reactive  Baseline: 135 per minute  Variability: moderate  Accelerations: yes  Decelerations: none  Uterine contractions: none    Prenatal Labs:   No results found for: RUBELLAEXT, GRBSEXT, HBSAGEXT, HIVEXT, RPREXT, GONNOEXT, CHLAMEXT     Assessment/Plan:   G1 @ 30.3 weeks  PIH  GDM  Shortness of breath  HA and visual changes  Tachycardic  Plan:  Lonnie Ramsey is short of breath. While lying on cot she is physically working hard to breath while conversing. Reviewed with Dr. Jamel Oswald. She is aware of pt and in agreement with plan of care.   Admit to observation  [address] labs  CT scan to r/o PE  D dimer    Reviewed   Signed By:  Kim Meier CNM     October 13, 2022

## 2022-10-13 NOTE — DISCHARGE INSTRUCTIONS
Weeks 26 to 30 of Your Pregnancy: Care Instructions  Overview     You are now entering your last trimester of pregnancy. Your baby is growing quickly. Lauren Fenton probably feel your baby moving around more often. Your doctor may ask you to count your baby's kicks. Your back may ache as your body gets used to your baby's size and length. If you haven't already had the Tdap shot during this pregnancy, talk to your doctor about getting it. It will help protect your  against pertussis infection. During this time, it's important to take care of yourself and pay attention to what your body needs. If you feel sexual, you can explore ways to be close with your partner that match your comfort and desire. Follow-up care is a key part of your treatment and safety. Be sure to make and go to all appointments, and call your doctor if you are having problems. It's also a good idea to know your test results and keep a list of the medicines you take. How can you care for yourself at home? Take it easy at work  Take frequent breaks. If possible, stop working when you are tired, and rest during your lunch hour. Take bathroom breaks every 2 hours. Change positions often. If you sit for long periods, stand up and walk around. When you stand for a long time, keep one foot on a low stool with your knee bent. After standing a lot, sit with your feet up. Avoid fumes, chemicals, and tobacco smoke. Be sexual in your own way  Having sex during pregnancy is okay, unless your doctor tells you not to. You may be very interested in sex, or you may have no interest at all. Your growing belly can make it hard to find a good position during intercourse. [place] and explore. You may get cramps in your uterus when your partner touches your breasts. A back rub may relieve the backache or cramps that sometimes follow orgasm. Learn about  labor  Watch for signs of  labor.  You may be going into labor if:  You have menstrual-like cramps, with or without nausea. You have about 6 or more contractions in 1 hour, even after you have had a glass of water and are resting. You have a low, dull backache that does not go away when you change your position. You have pain or pressure in your pelvis that comes and goes in a pattern. You have intestinal cramping or flu-like symptoms, with or without diarrhea. You notice an increase or change in your vaginal discharge. Discharge may be heavy, mucus-like, watery, or streaked with blood. Your water breaks. If you think you have  labor:  Drink 2 or 3 glasses of water or juice. Not drinking enough fluids can cause contractions. Stop what you are doing, and empty your bladder. Then lie down on your left side for at least 1 hour. While lying on your side, find your breast bone. Put your fingers in the soft spot just below it. Move your fingers down toward your belly button to find the top of your uterus. Check to see if it is tight. Contractions can be weak or strong. Record your contractions for an hour. Time a contraction from the start of one contraction to the start of the next one. Single or several strong contractions without a pattern are called Llano-Nguyen contractions. They are practice contractions but not the start of labor. They often stop if you change what you are doing. Call your doctor if you have regular contractions. Where can you learn more? Go to http://www.gray.com/  Enter V906 in the search box to learn more about \"Weeks 26 to 30 of Your Pregnancy: Care Instructions. \"  Current as of: 2021               Content Version: 13.2  © 3666-7028 [employer]. Care instructions adapted under license by [employer] (which disclaims liability or warranty for this information).  If you have questions about a medical condition or this instruction, always ask your healthcare professional. [employer] disclaims any warranty or liability for your use of this information.

## 2022-10-13 NOTE — PROGRESS NOTES
Derrick Spencer is a G1 at 30.3 weeks GA. She has Preeclampsia and GDM. She was seen in [facility] today with shortness of breath. Visit Vitals  /65   Pulse (!) 124   Temp 97.9 °F (36.6 °C)   Resp 18   Ht 5' 7\" (1.702 m)   Wt 210 lb (95.3 kg)   LMP 03/14/2022   SpO2 96%   BMI 32.89 kg/m²      Recent Results (from the past 12 hour(s))   D DIMER    Collection Time: 10/13/22  1:27 PM   Result Value Ref Range    D-dimer 1.29 (H) 0.00 - 0.65 mg/L FEU   PROTEIN/CREATININE RATIO, URINE    Collection Time: 10/13/22  1:27 PM   Result Value Ref Range    Protein, urine random 22 (H) 0.0 - 11.9 mg/dL    Creatinine, urine random 79.70 mg/dL    Protein/Creat. urine Ratio 0.3     CBC WITH AUTOMATED DIFF    Collection Time: 10/13/22  1:27 PM   Result Value Ref Range    WBC 11.1 (H) 3.6 - 11.0 K/uL    RBC 3.60 (L) 3.80 - 5.20 M/uL    HGB 10.6 (L) 11.5 - 16.0 g/dL    HCT 32.9 (L) 35.0 - 47.0 %    MCV 91.4 80.0 - 99.0 FL    MCH 29.4 26.0 - 34.0 PG    MCHC 32.2 30.0 - 36.5 g/dL    RDW 13.7 11.5 - 14.5 %    PLATELET 147 390 - 413 K/uL    MPV 10.5 8.9 - 12.9 FL    NRBC 0.0 0  WBC    ABSOLUTE NRBC 0.00 0.00 - 0.01 K/uL    NEUTROPHILS 80 (H) 32 - 75 %    LYMPHOCYTES 12 12 - 49 %    MONOCYTES 6 5 - 13 %    EOSINOPHILS 0 0 - 7 %    BASOPHILS 0 0 - 1 %    IMMATURE GRANULOCYTES 2 (H) 0.0 - 0.5 %    ABS. NEUTROPHILS 8.9 (H) 1.8 - 8.0 K/UL    ABS. LYMPHOCYTES 1.3 0.8 - 3.5 K/UL    ABS. MONOCYTES 0.6 0.0 - 1.0 K/UL    ABS. EOSINOPHILS 0.0 0.0 - 0.4 K/UL    ABS. BASOPHILS 0.0 0.0 - 0.1 K/UL    ABS. IMM.  GRANS. 0.2 (H) 0.00 - 0.04 K/UL    DF AUTOMATED     URIC ACID    Collection Time: 10/13/22  1:27 PM   Result Value Ref Range    Uric acid 3.7 2.6 - 6.0 MG/DL   LD    Collection Time: 10/13/22  1:27 PM   Result Value Ref Range     81 - 246 U/L   URINALYSIS W/ REFLEX CULTURE    Collection Time: 10/13/22  1:27 PM    Specimen: Urine   Result Value Ref Range    Color YELLOW      Appearance CLOUDY (A) CLEAR      Specific gravity 1.014 1.003 - 1.030      pH (UA) 5.0 5.0 - 8.0      Protein TRACE (A) NEG mg/dL    Glucose Negative NEG mg/dL    Ketone 80 (A) NEG mg/dL    Bilirubin Negative NEG      Blood Negative NEG      Urobilinogen 0.2 0.2 - 1.0 EU/dL    Nitrites Negative NEG      Leukocyte Esterase Negative NEG      WBC 0-4 0 - 4 /hpf    RBC 0-5 0 - 5 /hpf    Epithelial cells MODERATE (A) FEW /lpf    Bacteria Negative NEG /hpf    UA:UC IF INDICATED CULTURE NOT INDICATED BY UA RESULT CNI     METABOLIC PANEL, COMPREHENSIVE    Collection Time: 10/13/22  1:27 PM   Result Value Ref Range    Sodium 137 136 - 145 mmol/L    Potassium 4.3 3.5 - 5.1 mmol/L    Chloride 105 97 - 108 mmol/L    CO2 24 21 - 32 mmol/L    Anion gap 8 5 - 15 mmol/L    Glucose 85 65 - 100 mg/dL    BUN 7 6 - 20 MG/DL    Creatinine 0.64 0.55 - 1.02 MG/DL    BUN/Creatinine ratio 11 (L) 12 - 20      eGFR >60 >60 ml/min/1.73m2    Calcium 9.5 8.5 - 10.1 MG/DL    Bilirubin, total 0.3 0.2 - 1.0 MG/DL    ALT (SGPT) 20 12 - 78 U/L    AST (SGOT) 15 15 - 37 U/L    Alk. phosphatase 125 (H) 45 - 117 U/L    Protein, total 6.8 6.4 - 8.2 g/dL    Albumin 3.1 (L) 3.5 - 5.0 g/dL    Globulin 3.7 2.0 - 4.0 g/dL    A-G Ratio 0.8 (L) 1.1 - 2.2        She also had a neg CT scan. PE was ruled out.   Discharged home  Script for albuterol  Instructed to call with worsening symptoms  F/U in 1 week  Dr. Princess Pastrana consulted and in agreement with plan

## 2022-10-13 NOTE — DISCHARGE SUMMARY
Antepartum Discharge Summary     Name: Dilcia Walter MRN: [id number]  SSN: [id number]    YOB: 1990  Age: 34 y.o. Sex: female      Allergies: Pcn [penicillins]    Admit Date: 10/13/2022    Discharge Date: 10/13/2022     Admitting Physician: Kathlyne Mcardle, MD     Attending Physician:  Niki Tenorio CNM     * Admission Diagnoses: No admission diagnoses are documented for this encounter. GDM  PreE  Shortness of breath    * Discharge Diagnoses:   Hospital Problems as of 10/13/2022 Date Reviewed: 10/10/2022   None     Lab Results   Component Value Date/Time    ABO/Rh(D) A POSITIVE 06/10/2022 11:25 AM      There is no immunization history on file for this patient. * Discharge Condition: improved    * Procedures: CT scan  * No surgery found [facility] Course:    - R/O PE    * Disposition: Home    Discharge Medications:   Current Discharge Medication List        START taking these medications    Details   albuterol (PROVENTIL HFA, VENTOLIN HFA, PROAIR HFA) 90 mcg/actuation inhaler Take 1 Puff by inhalation every four (4) hours as needed for Wheezing. Take 1 puff by inhalation every 4 hours as needed for wheezing and/or shortness of breath  Qty: 18 g, Refills: 1  Start date: 10/13/2022           CONTINUE these medications which have NOT CHANGED    Details   Blood-Glucose Meter monitoring kit QID testing, FBS, and 1 hr PP GDM  Qty: 1 Kit, Refills: 0    Comments: Please provide Glucometer that insurance covers      glucose blood VI test strips strip Please provide test strips corresponding with glucometer that pt's insurance covers. QID testing. GDM  Qty: 100 Strip, Refills: 5      lancets misc QID testing, GCM  Qty: 100 Each, Refills: 5      ondansetron (ZOFRAN ODT) 8 mg disintegrating tablet Take 1 Tablet by mouth every eight (8) hours as needed for Nausea or Vomiting. Qty: 30 Tablet, Refills: 1      PNV Comb #2-Iron-Omega 3-FA 11-6-305-200 mg cmpk Take  by mouth.              * Follow-up Care/Patient Instructions:   Activity: Activity as tolerated  Diet: Regular Diet  Wound Care: None needed    Follow up with CNM in 1 week

## 2022-10-13 NOTE — PROGRESS NOTES
[id number] Report given from FANTASMA Keen. PIV placed for CT scan ordered by [facility]. [id number] Patient left for CT.    1745 Patient returned from CT. 1850 Patient discharged to home per JOSÉ MIGUEL.

## 2022-10-21 ENCOUNTER — HOSPITAL ENCOUNTER (OUTPATIENT)
Dept: PERINATAL CARE | Age: 32
Discharge: HOME | End: 2022-10-21
Attending: OBSTETRICS & GYNECOLOGY
Payer: COMMERCIAL

## 2022-10-21 PROCEDURE — 76819 FETAL BIOPHYS PROFIL W/O NST: CPT | Performed by: OBSTETRICS & GYNECOLOGY
